# Patient Record
Sex: MALE | Race: WHITE | NOT HISPANIC OR LATINO
[De-identification: names, ages, dates, MRNs, and addresses within clinical notes are randomized per-mention and may not be internally consistent; named-entity substitution may affect disease eponyms.]

---

## 2020-07-13 ENCOUNTER — RESULT REVIEW (OUTPATIENT)
Age: 65
End: 2020-07-13

## 2021-01-18 ENCOUNTER — INPATIENT (INPATIENT)
Facility: HOSPITAL | Age: 66
LOS: 2 days | Discharge: HOME | DRG: 286 | End: 2021-01-21
Attending: STUDENT IN AN ORGANIZED HEALTH CARE EDUCATION/TRAINING PROGRAM | Admitting: STUDENT IN AN ORGANIZED HEALTH CARE EDUCATION/TRAINING PROGRAM
Payer: COMMERCIAL

## 2021-01-18 VITALS
TEMPERATURE: 98 F | HEART RATE: 64 BPM | SYSTOLIC BLOOD PRESSURE: 126 MMHG | DIASTOLIC BLOOD PRESSURE: 79 MMHG | HEIGHT: 69 IN | OXYGEN SATURATION: 98 % | WEIGHT: 164.91 LBS | RESPIRATION RATE: 18 BRPM

## 2021-01-18 DIAGNOSIS — I48.91 UNSPECIFIED ATRIAL FIBRILLATION: ICD-10-CM

## 2021-01-18 PROBLEM — Z00.00 ENCOUNTER FOR PREVENTIVE HEALTH EXAMINATION: Status: ACTIVE | Noted: 2021-01-18

## 2021-01-18 LAB
ALBUMIN SERPL ELPH-MCNC: 4.4 G/DL — SIGNIFICANT CHANGE UP (ref 3.3–5)
ALP SERPL-CCNC: 53 U/L — SIGNIFICANT CHANGE UP (ref 40–120)
ALT FLD-CCNC: 279 U/L — HIGH (ref 10–45)
ANION GAP SERPL CALC-SCNC: 20 MMOL/L — HIGH (ref 5–17)
APPEARANCE UR: CLEAR — SIGNIFICANT CHANGE UP
APTT BLD: 27.3 SEC — LOW (ref 27.5–35.5)
AST SERPL-CCNC: 156 U/L — HIGH (ref 10–40)
BACTERIA # UR AUTO: NEGATIVE — SIGNIFICANT CHANGE UP
BASE EXCESS BLDV CALC-SCNC: -1.7 MMOL/L — SIGNIFICANT CHANGE UP (ref -2–2)
BASE EXCESS BLDV CALC-SCNC: -3.3 MMOL/L — LOW (ref -2–2)
BASOPHILS # BLD AUTO: 0 K/UL — SIGNIFICANT CHANGE UP (ref 0–0.2)
BASOPHILS NFR BLD AUTO: 0 % — SIGNIFICANT CHANGE UP (ref 0–2)
BILIRUB SERPL-MCNC: 0.7 MG/DL — SIGNIFICANT CHANGE UP (ref 0.2–1.2)
BILIRUB UR-MCNC: NEGATIVE — SIGNIFICANT CHANGE UP
BUN SERPL-MCNC: 39 MG/DL — HIGH (ref 7–23)
CA-I SERPL-SCNC: 1.15 MMOL/L — SIGNIFICANT CHANGE UP (ref 1.12–1.3)
CA-I SERPL-SCNC: 1.16 MMOL/L — SIGNIFICANT CHANGE UP (ref 1.12–1.3)
CALCIUM SERPL-MCNC: 9.6 MG/DL — SIGNIFICANT CHANGE UP (ref 8.4–10.5)
CHLORIDE BLDV-SCNC: 106 MMOL/L — SIGNIFICANT CHANGE UP (ref 96–108)
CHLORIDE BLDV-SCNC: 108 MMOL/L — SIGNIFICANT CHANGE UP (ref 96–108)
CHLORIDE SERPL-SCNC: 100 MMOL/L — SIGNIFICANT CHANGE UP (ref 96–108)
CO2 BLDV-SCNC: 25 MMOL/L — SIGNIFICANT CHANGE UP (ref 22–30)
CO2 BLDV-SCNC: 26 MMOL/L — SIGNIFICANT CHANGE UP (ref 22–30)
CO2 SERPL-SCNC: 17 MMOL/L — LOW (ref 22–31)
COLOR SPEC: YELLOW — SIGNIFICANT CHANGE UP
CREAT SERPL-MCNC: 1.75 MG/DL — HIGH (ref 0.5–1.3)
DIFF PNL FLD: NEGATIVE — SIGNIFICANT CHANGE UP
EOSINOPHIL # BLD AUTO: 0.12 K/UL — SIGNIFICANT CHANGE UP (ref 0–0.5)
EOSINOPHIL NFR BLD AUTO: 0.9 % — SIGNIFICANT CHANGE UP (ref 0–6)
EPI CELLS # UR: 1 /HPF — SIGNIFICANT CHANGE UP
GAS PNL BLDV: 136 MMOL/L — SIGNIFICANT CHANGE UP (ref 135–145)
GAS PNL BLDV: 139 MMOL/L — SIGNIFICANT CHANGE UP (ref 135–145)
GAS PNL BLDV: SIGNIFICANT CHANGE UP
GAS PNL BLDV: SIGNIFICANT CHANGE UP
GIANT PLATELETS BLD QL SMEAR: PRESENT — SIGNIFICANT CHANGE UP
GLUCOSE BLDV-MCNC: 147 MG/DL — HIGH (ref 70–99)
GLUCOSE BLDV-MCNC: 157 MG/DL — HIGH (ref 70–99)
GLUCOSE SERPL-MCNC: 195 MG/DL — HIGH (ref 70–99)
GLUCOSE UR QL: ABNORMAL
HCO3 BLDV-SCNC: 23 MMOL/L — SIGNIFICANT CHANGE UP (ref 21–29)
HCO3 BLDV-SCNC: 25 MMOL/L — SIGNIFICANT CHANGE UP (ref 21–29)
HCT VFR BLD CALC: 43.2 % — SIGNIFICANT CHANGE UP (ref 39–50)
HCT VFR BLDA CALC: 40 % — SIGNIFICANT CHANGE UP (ref 39–50)
HCT VFR BLDA CALC: 41 % — SIGNIFICANT CHANGE UP (ref 39–50)
HGB BLD CALC-MCNC: 13 G/DL — SIGNIFICANT CHANGE UP (ref 13–17)
HGB BLD CALC-MCNC: 13.5 G/DL — SIGNIFICANT CHANGE UP (ref 13–17)
HGB BLD-MCNC: 14 G/DL — SIGNIFICANT CHANGE UP (ref 13–17)
HYALINE CASTS # UR AUTO: 6 /LPF — HIGH (ref 0–2)
INR BLD: 1.09 RATIO — SIGNIFICANT CHANGE UP (ref 0.88–1.16)
KETONES UR-MCNC: NEGATIVE — SIGNIFICANT CHANGE UP
LACTATE BLDV-MCNC: 3.2 MMOL/L — HIGH (ref 0.7–2)
LACTATE BLDV-MCNC: 4.2 MMOL/L — CRITICAL HIGH (ref 0.7–2)
LEUKOCYTE ESTERASE UR-ACNC: NEGATIVE — SIGNIFICANT CHANGE UP
LYMPHOCYTES # BLD AUTO: 16.5 % — SIGNIFICANT CHANGE UP (ref 13–44)
LYMPHOCYTES # BLD AUTO: 2.25 K/UL — SIGNIFICANT CHANGE UP (ref 1–3.3)
MAGNESIUM SERPL-MCNC: 1.3 MG/DL — LOW (ref 1.6–2.6)
MANUAL SMEAR VERIFICATION: SIGNIFICANT CHANGE UP
MCHC RBC-ENTMCNC: 30.6 PG — SIGNIFICANT CHANGE UP (ref 27–34)
MCHC RBC-ENTMCNC: 32.4 GM/DL — SIGNIFICANT CHANGE UP (ref 32–36)
MCV RBC AUTO: 94.5 FL — SIGNIFICANT CHANGE UP (ref 80–100)
MONOCYTES # BLD AUTO: 1.06 K/UL — HIGH (ref 0–0.9)
MONOCYTES NFR BLD AUTO: 7.8 % — SIGNIFICANT CHANGE UP (ref 2–14)
NEUTROPHILS # BLD AUTO: 10.2 K/UL — HIGH (ref 1.8–7.4)
NEUTROPHILS NFR BLD AUTO: 74.8 % — SIGNIFICANT CHANGE UP (ref 43–77)
NITRITE UR-MCNC: NEGATIVE — SIGNIFICANT CHANGE UP
NRBC # BLD: 1 /100 — HIGH (ref 0–0)
PCO2 BLDV: 50 MMHG — SIGNIFICANT CHANGE UP (ref 35–50)
PCO2 BLDV: 50 MMHG — SIGNIFICANT CHANGE UP (ref 35–50)
PH BLDV: 7.29 — LOW (ref 7.35–7.45)
PH BLDV: 7.31 — LOW (ref 7.35–7.45)
PH UR: 6 — SIGNIFICANT CHANGE UP (ref 5–8)
PHOSPHATE SERPL-MCNC: 6 MG/DL — HIGH (ref 2.5–4.5)
PLAT MORPH BLD: ABNORMAL
PLATELET # BLD AUTO: 361 K/UL — SIGNIFICANT CHANGE UP (ref 150–400)
PO2 BLDV: 23 MMHG — LOW (ref 25–45)
PO2 BLDV: <20 MMHG — LOW (ref 25–45)
POTASSIUM BLDV-SCNC: 4 MMOL/L — SIGNIFICANT CHANGE UP (ref 3.5–5.3)
POTASSIUM BLDV-SCNC: 4.6 MMOL/L — SIGNIFICANT CHANGE UP (ref 3.5–5.3)
POTASSIUM SERPL-MCNC: 4.5 MMOL/L — SIGNIFICANT CHANGE UP (ref 3.5–5.3)
POTASSIUM SERPL-SCNC: 4.5 MMOL/L — SIGNIFICANT CHANGE UP (ref 3.5–5.3)
PROT SERPL-MCNC: 7.1 G/DL — SIGNIFICANT CHANGE UP (ref 6–8.3)
PROT UR-MCNC: ABNORMAL
PROTHROM AB SERPL-ACNC: 13 SEC — SIGNIFICANT CHANGE UP (ref 10.6–13.6)
RBC # BLD: 4.57 M/UL — SIGNIFICANT CHANGE UP (ref 4.2–5.8)
RBC # FLD: 13.5 % — SIGNIFICANT CHANGE UP (ref 10.3–14.5)
RBC BLD AUTO: SIGNIFICANT CHANGE UP
RBC CASTS # UR COMP ASSIST: 1 /HPF — SIGNIFICANT CHANGE UP (ref 0–4)
SAO2 % BLDV: 15 % — LOW (ref 67–88)
SAO2 % BLDV: 21 % — LOW (ref 67–88)
SODIUM SERPL-SCNC: 137 MMOL/L — SIGNIFICANT CHANGE UP (ref 135–145)
SP GR SPEC: 1.05 — HIGH (ref 1.01–1.02)
TROPONIN T, HIGH SENSITIVITY RESULT: 21 NG/L — SIGNIFICANT CHANGE UP (ref 0–51)
TROPONIN T, HIGH SENSITIVITY RESULT: 24 NG/L — SIGNIFICANT CHANGE UP (ref 0–51)
UROBILINOGEN FLD QL: NEGATIVE — SIGNIFICANT CHANGE UP
WBC # BLD: 13.64 K/UL — HIGH (ref 3.8–10.5)
WBC # FLD AUTO: 13.64 K/UL — HIGH (ref 3.8–10.5)
WBC UR QL: 2 /HPF — SIGNIFICANT CHANGE UP (ref 0–5)

## 2021-01-18 PROCEDURE — 93010 ELECTROCARDIOGRAM REPORT: CPT

## 2021-01-18 PROCEDURE — 99223 1ST HOSP IP/OBS HIGH 75: CPT | Mod: GC

## 2021-01-18 PROCEDURE — 71275 CT ANGIOGRAPHY CHEST: CPT | Mod: 26

## 2021-01-18 PROCEDURE — 71045 X-RAY EXAM CHEST 1 VIEW: CPT | Mod: 26

## 2021-01-18 PROCEDURE — 99291 CRITICAL CARE FIRST HOUR: CPT

## 2021-01-18 PROCEDURE — 99255 IP/OBS CONSLTJ NEW/EST HI 80: CPT

## 2021-01-18 RX ORDER — HEPARIN SODIUM 5000 [USP'U]/ML
6500 INJECTION INTRAVENOUS; SUBCUTANEOUS ONCE
Refills: 0 | Status: COMPLETED | OUTPATIENT
Start: 2021-01-18 | End: 2021-01-18

## 2021-01-18 RX ORDER — ASPIRIN/CALCIUM CARB/MAGNESIUM 324 MG
162 TABLET ORAL DAILY
Refills: 0 | Status: DISCONTINUED | OUTPATIENT
Start: 2021-01-18 | End: 2021-01-19

## 2021-01-18 RX ORDER — METOPROLOL TARTRATE 50 MG
10 TABLET ORAL ONCE
Refills: 0 | Status: DISCONTINUED | OUTPATIENT
Start: 2021-01-18 | End: 2021-01-18

## 2021-01-18 RX ORDER — METOPROLOL TARTRATE 50 MG
5 TABLET ORAL ONCE
Refills: 0 | Status: COMPLETED | OUTPATIENT
Start: 2021-01-18 | End: 2021-01-18

## 2021-01-18 RX ORDER — HEPARIN SODIUM 5000 [USP'U]/ML
INJECTION INTRAVENOUS; SUBCUTANEOUS
Qty: 25000 | Refills: 0 | Status: DISCONTINUED | OUTPATIENT
Start: 2021-01-18 | End: 2021-01-20

## 2021-01-18 RX ORDER — DILTIAZEM HCL 120 MG
10 CAPSULE, EXT RELEASE 24 HR ORAL ONCE
Refills: 0 | Status: DISCONTINUED | OUTPATIENT
Start: 2021-01-18 | End: 2021-01-18

## 2021-01-18 RX ORDER — DILTIAZEM HCL 120 MG
30 CAPSULE, EXT RELEASE 24 HR ORAL ONCE
Refills: 0 | Status: COMPLETED | OUTPATIENT
Start: 2021-01-18 | End: 2021-01-18

## 2021-01-18 RX ORDER — SODIUM CHLORIDE 9 MG/ML
500 INJECTION INTRAMUSCULAR; INTRAVENOUS; SUBCUTANEOUS ONCE
Refills: 0 | Status: COMPLETED | OUTPATIENT
Start: 2021-01-18 | End: 2021-01-18

## 2021-01-18 RX ORDER — FUROSEMIDE 40 MG
20 TABLET ORAL ONCE
Refills: 0 | Status: COMPLETED | OUTPATIENT
Start: 2021-01-18 | End: 2021-01-18

## 2021-01-18 RX ORDER — DILTIAZEM HCL 120 MG
10 CAPSULE, EXT RELEASE 24 HR ORAL ONCE
Refills: 0 | Status: COMPLETED | OUTPATIENT
Start: 2021-01-18 | End: 2021-01-18

## 2021-01-18 RX ORDER — MAGNESIUM SULFATE 500 MG/ML
2 VIAL (ML) INJECTION ONCE
Refills: 0 | Status: COMPLETED | OUTPATIENT
Start: 2021-01-18 | End: 2021-01-18

## 2021-01-18 RX ORDER — ACETAMINOPHEN 500 MG
1000 TABLET ORAL ONCE
Refills: 0 | Status: COMPLETED | OUTPATIENT
Start: 2021-01-18 | End: 2021-01-18

## 2021-01-18 RX ORDER — DILTIAZEM HCL 120 MG
30 CAPSULE, EXT RELEASE 24 HR ORAL EVERY 6 HOURS
Refills: 0 | Status: DISCONTINUED | OUTPATIENT
Start: 2021-01-18 | End: 2021-01-19

## 2021-01-18 RX ORDER — METOPROLOL TARTRATE 50 MG
50 TABLET ORAL ONCE
Refills: 0 | Status: DISCONTINUED | OUTPATIENT
Start: 2021-01-18 | End: 2021-01-18

## 2021-01-18 RX ADMIN — Medication 20 MILLIGRAM(S): at 23:28

## 2021-01-18 RX ADMIN — Medication 5 MILLIGRAM(S): at 18:01

## 2021-01-18 RX ADMIN — Medication 20 MILLIGRAM(S): at 21:33

## 2021-01-18 RX ADMIN — SODIUM CHLORIDE 500 MILLILITER(S): 9 INJECTION INTRAMUSCULAR; INTRAVENOUS; SUBCUTANEOUS at 19:16

## 2021-01-18 RX ADMIN — Medication 400 MILLIGRAM(S): at 21:38

## 2021-01-18 RX ADMIN — Medication 50 GRAM(S): at 19:40

## 2021-01-18 RX ADMIN — Medication 162 MILLIGRAM(S): at 21:33

## 2021-01-18 RX ADMIN — HEPARIN SODIUM 1400 UNIT(S)/HR: 5000 INJECTION INTRAVENOUS; SUBCUTANEOUS at 20:47

## 2021-01-18 RX ADMIN — Medication 30 MILLIGRAM(S): at 19:47

## 2021-01-18 RX ADMIN — Medication 5 MILLIGRAM(S): at 18:00

## 2021-01-18 RX ADMIN — HEPARIN SODIUM 6500 UNIT(S): 5000 INJECTION INTRAVENOUS; SUBCUTANEOUS at 20:49

## 2021-01-18 RX ADMIN — Medication 10 MILLIGRAM(S): at 19:28

## 2021-01-18 NOTE — H&P ADULT - PROBLEM SELECTOR PLAN 2
Patient possibly has disc herniation  However no imaging done at this time  Obtain MRI lumbar spine.  Patient was prescribed a prednisone taper outpatient, now tapering down to 10mg qday this week. Day team can call patient's PCP Dr. chay Paul [phone] to discuss what's the working diagnosis and if the prednisone was supposed to be tapered off. Patient presented with SOB, found to have Afib with RVR, Hr at 190s at presentation  s/p lopressor, diltiazem IV push.  Cardiology consulted, appreciate recs  c/w cardizem 30mg q6h, and PRN cardizem 10mg IV push.  c/w heparin drip.  NPO today for possible PIERRE No baseline Cr available,   if worsen, renal consult.  send urine lytes. Obtain kidney and bladder US  day team can call patient's PCP Dr. Mikhail boothe [phone] for further info  Hold off IVF for now considering patient had pulmonary edema in the ER.

## 2021-01-18 NOTE — H&P ADULT - ASSESSMENT
64 yo M hx of HTN, HLD, T2DM, presented with SOB, found to have Afib with RVR 66 yo M hx of HTN, HLD, T2DM, presented with SOB, found to have Afib with RVR, also has right leg pain. 66 yo M hx of HTN, HLD, T2DM, presented with SOB, found to have Afib with RVR, SUSAN, transaminitis, likely mild pulm edema, and chronic right leg pain.

## 2021-01-18 NOTE — H&P ADULT - PROBLEM SELECTOR PLAN 5
c/w home atorvastatin 40, fenofibrate 145 hepatitis panel  abdominal US with kidney and bladder ultrasound

## 2021-01-18 NOTE — ED PROVIDER NOTE - NS ED ROS FT
Review of Systems:  · Constitutional: episode of near syncope, no chills, no fever, no night sweats, no weight loss  · Nose: no epistaxis  · Mouth/Throat: no difficulty in swallowing, trachea midline, uvula midline  . Cardio: No CP, no palpitations, no chest pressure, no ripping chest pain  · Respiratory: no cough, no exertional dyspnea, no hemoptysis, no orthopnea, no shortness of breath  · Gastrointestinal: no abdominal pain, no diarrhea, no melena, no nausea, no vomiting  · Genitourinary: no difficulty urinating, no dysuria, no hematuria  · MUSCULOSKELETAL: RLE pain, FROM of all extremities  · Skin: no abrasion; no bruising; no laceration  · Neurological: no change in level of consciousness, no headache, no seizures  · Endocrine: no excessive urination  · ROS STATEMENT: all other ROS negative except as per HPI Review of Systems:  · Constitutional: episode of near syncope, no chills, no fever, no night sweats, no weight loss  · Nose: no epistaxis  · Mouth/Throat: no difficulty in swallowing, trachea midline, uvula midline  . Cardio: No CP, no palpitations, no chest pressure, no ripping chest pain  · Respiratory: no cough, + exertional dyspnea, no hemoptysis, no orthopnea, + shortness of breath  · Gastrointestinal: no abdominal pain, no diarrhea, no melena, no nausea, no vomiting  · Genitourinary: no difficulty urinating, no dysuria, no hematuria  · MUSCULOSKELETAL: RLE pain, FROM of all extremities  · Skin: no abrasion; no bruising; no laceration  · Neurological: no change in level of consciousness, no headache, no seizures  · Endocrine: no excessive urination  · ROS STATEMENT: all other ROS negative except as per HPI

## 2021-01-18 NOTE — ED ADULT TRIAGE NOTE - CHIEF COMPLAINT QUOTE
shortness of breath x 4-5 days. Received covid vaccine saturday. Also c/o right calf pain x 6 months. Sent by PCP Mikhail Paul

## 2021-01-18 NOTE — H&P ADULT - NSHPREVIEWOFSYSTEMS_GEN_ALL_CORE
REVIEW OF SYSTEMS:    CONSTITUTIONAL: No weakness, fevers or chills  EYES: No visual changes;    ENT: No vertigo or throat pain   NECK: No pain or stiffness  RESPIRATORY: No cough, wheezing, hemoptysis; no shortness of breath  CARDIOVASCULAR: No chest pain or palpitations  GASTROINTESTINAL: No abdominal or epigastric pain. No nausea, vomiting, or hematemesis; No diarrhea or constipation. No melena or hematochezia.  GENITOURINARY: No dysuria, frequency or hematuria  NEUROLOGICAL: No numbness, right leg weakness and pain.  SKIN: No itching, rashes  Psychiatric: no SI or HI  Extremities: Right leg weakness and pain.

## 2021-01-18 NOTE — H&P ADULT - PROBLEM SELECTOR PLAN 3
Patient is on metformin 500 at home  No prior A1C available  low SSI for now  A1c with morning lab No baseline Cr available,   if worsen, renal consult.  send urine lytes. Obtain kidney and bladder US  day team can call patient's PCP Dr. Mikhail boothe [phone] for further info  Hold off IVF for now considering patient had pulmonary edema in the ER. Possibly 2/2 afib with rvr vs CHF exacerbation  likely cause of patient's SOB, mild hypoxia.  s/p 20 lasix IV  - holding further diuresis  - TTE  - tele

## 2021-01-18 NOTE — H&P ADULT - NSHPSOCIALHISTORY_GEN_ALL_CORE
Patient works as a doorman. Patient lives with his wife, and is independent. Patient denies any smoking, or recreational drug use. Patient rarely drink alcohol

## 2021-01-18 NOTE — ED PROVIDER NOTE - OBJECTIVE STATEMENT
65 year old male with pmhx of HTN, HLD, DMT2 presents to the ED with episodes of near syncope and SOB. patient reports having RLE pain x 6 months that rad 65 year old male with pmhx of HTN, HLD, DMT2 presents to the ED with episodes of near syncope and SOB. patient reports having RLE pain x 6 months that radiates from R hip down leg. patient reports over the last week episodes of near syncope with walkiong 65 year old male with pmhx of HTN, HLD, DMT2 presents to the ED with episodes of near syncope and SOB. patient reports having RLE pain x 6 months that radiates from R hip down leg. patient reports over the last week episodes of near syncope with walking, increased sob with minimal exertion. No new nausea/vomiting/ diarrhea/ abd pain. No orthopnea but pt normally sleeps upright so pt is not sure. Pt has been on po steroids recently for his diffuse sharp spasms of back pain and leg pains- has seen neurologist but has not had any imaging thus far. 65 year old male with pmhx of HTN, HLD, DMT2 presents to the ED with episodes of near syncope and SOB. patient reports having RLE pain x 6 months that radiates from R hip down leg. patient reports over the last week episodes of near syncope with walking, increased sob with minimal exertion. No new nausea/vomiting/ diarrhea/ abd pain. No orthopnea but pt normally sleeps upright so pt is not sure. Pt has been on po steroids recently for his diffuse sharp spasms of back pain and leg pains- has seen neurologist but has not had any imaging thus far. patient denies chest pain, Shortness of Breath, abdominal pain, Nausea/Vomiting/Diarrhea, weakness, confusion, vision changes, urinary symptoms, syncope, falls, trauma, discharge, bleeding, fevers 65 year old male with pmhx of HTN, HLD, DMT2 presents to the ED with episodes of near syncope and SOB. patient reports having RLE pain x 6 months that radiates from R hip down leg. patient reports over the last week episodes of near syncope with walking, increased sob with minimal exertion. No new nausea/vomiting/ diarrhea/ abd pain. No orthopnea but pt normally sleeps upright so pt is not sure. Pt has been on po steroids recently for his diffuse sharp spasms of back pain and leg pains- has seen neurologist but has not had any imaging thus far. Patient reports sob w/ exertion. Patient denies chest pain, abdominal pain, Nausea/Vomiting/Diarrhea, weakness, confusion, vision changes, urinary symptoms, falls, trauma, discharge, bleeding, fevers

## 2021-01-18 NOTE — H&P ADULT - PROBLEM SELECTOR PLAN 8
1. Name of PCP:  2. PCP contacted on admission: [  ] Y   [  ] N  3. PCP contacted at Discharge: [  ] Y   [  ] N  4. Post-Discharge Appointment Date and Location:   5 Summary of Handoff given to PCP: C/w metoprolol, cardizem for tachycardia  hold off amlodipine for now, can restart if bp goes high

## 2021-01-18 NOTE — ED PROVIDER NOTE - PHYSICAL EXAMINATION
On Physical Exam:  General: well appearing, in NAD, speaking clearly in full sentences and without difficulty; cooperative with exam  HEENT: PERRL, MMM  Neck: no neck tenderness, no nuchal rigidity  Cardiac: tachycardic s1, s2; irregularly irregular  Lungs: CTABL  Abdomen: soft nontender/nondistended  : no bladder tenderness or distension  Skin: warm, intact, no rash  Extremities: RLE pain to palpation to posterior extremity, no peripheral edema, no gross deformities  Neuro: no gross neurologic deficits

## 2021-01-18 NOTE — ED ADULT NURSE NOTE - OBJECTIVE STATEMENT
66 yo m pmx of HTN on metoprolol, HLD, DM presents to the ED with c/o SOB and JI. PT reports he received the covid vaccine, and noticed that for the past two days he has been experiencing SOB, states "after I shower I get extremely tired." Pt reports becoming increasingly exhausted when he ambulates a couple of blocks, reports he gets tired after walking a flight of stairs. patient also reports having RLE pain x 6 months that radiates from R hip down leg. PT states that he also noticed that over the past weeks he has been experiencing near syncopal episodes at home, states that his vision "sort of goes blurry." Pt denies headache, dizziness, chest pain, palpitations, cough, abdominal pain, n/v/d, urinary symptoms, fevers, chills, weakness at this time. PT placed on cardiac monitoring, MD Peace aware of the pt's current HR. Cards at bedside.

## 2021-01-18 NOTE — ED PROVIDER NOTE - PROGRESS NOTE DETAILS
Peace DO: spoke w/ dr soares on behalf of dr boothe's office, admits to hospitalist. updated their office on current plan of care. pt seen by cards, bedside pocus without significant pericardial effusion or RV strain - plethoric ivc, slight jvd on exam, but no b lines or pleural effusions - trial gentle fluids , did not respond to lopressor x 2; will trial cardizem, hemodynamically stable but bps soft, will try gently hydration and try to get cta chest to r/o PE, lower suspicion Peace DO: pts -160, will obtain cta then start cardizem Peace DO: pts cta reviewed, no saddle embolism noted, so cardizem given 10mg, with improvement in HR , will give po dose, bp improved w/ rate control, pt asymptomatic at current. Peace DO: pt stable, aware of need for admission, HR  , predominantly 118 but now back up, will redose a dose of IV cardizem, lasix, and pts bp stable, repeat trop stable, will get repeat ekg, admitted to hospitalist under tele, cards following. afebrile, leukoctyosis suspected due to po steroids, lactate downtrending- poss from fluid overload. pt not septic Peace DO: hr low 100s each time RN about to give dilt push, cancelled Peace DO: hr 90 to low 100s each time RN about to give dilt push, cancelled IV push dose.

## 2021-01-18 NOTE — H&P ADULT - HISTORY OF PRESENT ILLNESS
66 yo M hx of HTN, HLD, T2DM presented with shortness of breath. Patient states that he has been having intermittent shortness of breath episodes for 5-6 days. Patient has episodes for 5-6 times per day. Patient states that he doesn't find any trigger for these episodes, no alleviating or aggravating factors. Patient visited his PCP and was told to come to the ER. Patient also complains right leg pain x6 months. Patient also has back pain. Patient's PCP told him his right leg pain is from his back but there hasn't any imaging done. It is scheduled on Wednesday.    In the ED patient's HR was noticed to be at 190s. Lopressor was given without proper response, cardizem IV push was given and patient's HR decreased to 100s. Cardiology has been consulted.

## 2021-01-18 NOTE — H&P ADULT - PROBLEM SELECTOR PLAN 6
No baseline Cr available,  day team can call patient's PCP Dr. Mikhail boothe [phone] for further info  Hold off IVF for now considering patient had pulmonary edema in the ER. Patient possibly has disc herniation  However no imaging done at this time  CT Lumbar spine.   PT eval  Patient was prescribed a prednisone taper outpatient, now tapering down to 10mg qday this week. Day team can call patient's PCP Dr. chay Paul [phone] to discuss what's the working diagnosis and if the prednisone was supposed to be tapered off.

## 2021-01-18 NOTE — H&P ADULT - NSHPPHYSICALEXAM_GEN_ALL_CORE
VITALS: Vital Signs Last 24 Hrs  T(C): 37.3 (19 Jan 2021 00:30), Max: 37.3 (19 Jan 2021 00:30)  T(F): 99.1 (19 Jan 2021 00:30), Max: 99.1 (19 Jan 2021 00:30)  HR: 95 (19 Jan 2021 00:30) (64 - 180)  BP: 109/61 (19 Jan 2021 00:30) (98/66 - 156/136)  BP(mean): 79 (18 Jan 2021 23:30) (74 - 96)  RR: 18 (19 Jan 2021 00:30) (16 - 20)  SpO2: 96% (19 Jan 2021 00:30) (93% - 98%)    GENERAL: NAD, lying in bed comfortably  HEAD:  Atraumatic, Normocephalic  EYES: EOMI, PERRLA, conjunctiva and sclera clear  ENT: Moist mucous membranes  NECK: Supple, No JVD  CHEST/LUNG: Clear to auscultation bilaterally; No rales, rhonchi, wheezing, or rubs. Unlabored respirations  HEART: Regular rate and rhythm; No murmurs, rubs, or gallops  ABDOMEN: Bowel sounds present; Soft, Nontender, Nondistended. No hepatomegally  EXTREMITIES:  2+ Peripheral Pulses, brisk capillary refill. No clubbing, cyanosis, or edema  NERVOUS SYSTEM:  Alert & Oriented X3, speech clear. RLE strength 4/5  MSK: Right leg pain on passive movement  SKIN: No rashes or lesions  Psychiatric: normal behavior, normal speech. VITALS: Vital Signs Last 24 Hrs  T(C): 37.3 (19 Jan 2021 00:30), Max: 37.3 (19 Jan 2021 00:30)  T(F): 99.1 (19 Jan 2021 00:30), Max: 99.1 (19 Jan 2021 00:30)  HR: 95 (19 Jan 2021 00:30) (64 - 180)  BP: 109/61 (19 Jan 2021 00:30) (98/66 - 156/136)  BP(mean): 79 (18 Jan 2021 23:30) (74 - 96)  RR: 18 (19 Jan 2021 00:30) (16 - 20)  SpO2: 96% (19 Jan 2021 00:30) (93% - 98%)    GENERAL: NAD, lying in bed comfortably  HEAD:  Atraumatic, Normocephalic  EYES: EOMI, PERRLA, conjunctiva and sclera clear  ENT: Moist mucous membranes  NECK: Supple, No JVD  CHEST/LUNG: Clear to auscultation bilaterally; No rales, rhonchi, wheezing, or rubs. Unlabored respirations  HEART: irregular rate and rhythm; No murmurs, rubs, or gallops  ABDOMEN: Bowel sounds present; Soft, Nontender, Nondistended. No hepatomegally  EXTREMITIES:  2+ Peripheral Pulses, brisk capillary refill. No clubbing, cyanosis, or edema  NERVOUS SYSTEM:  Alert & Oriented X3, speech clear. RLE strength 4/5  MSK: Right leg pain on passive movement  SKIN: No rashes or lesions  Psychiatric: normal behavior, normal speech. VITALS: Vital Signs Last 24 Hrs  T(C): 37.3 (19 Jan 2021 00:30), Max: 37.3 (19 Jan 2021 00:30)  T(F): 99.1 (19 Jan 2021 00:30), Max: 99.1 (19 Jan 2021 00:30)  HR: 95 (19 Jan 2021 00:30) (64 - 180)  BP: 109/61 (19 Jan 2021 00:30) (98/66 - 156/136)  BP(mean): 79 (18 Jan 2021 23:30) (74 - 96)  RR: 18 (19 Jan 2021 00:30) (16 - 20)  SpO2: 96% (19 Jan 2021 00:30) (93% - 98%)    GENERAL: NAD, lying in bed comfortably  HEAD:  Atraumatic, Normocephalic  EYES: EOMI, PERRLA, conjunctiva and sclera clear  ENT: Moist mucous membranes  NECK: Supple, No JVD  CHEST/LUNG: Clear to auscultation bilaterally; No rales, rhonchi, wheezing, or rubs. Unlabored respirations  HEART: irregular rate and rhythm; No murmurs, rubs, or gallops  ABDOMEN: Bowel sounds present; Soft, Nontender, Nondistended. No hepatomegally  EXTREMITIES:  2+ Peripheral Pulses, brisk capillary refill. No clubbing, cyanosis, or edema  NERVOUS SYSTEM:  Alert & Oriented X3, speech clear. RLE strength 4/5  MSK: Right leg pain on passive movement  SKIN: No rashes or lesions  Psychiatric: normal behavior, normal speech. Upset affect.

## 2021-01-18 NOTE — H&P ADULT - PROBLEM SELECTOR PLAN 4
C/w metoprolol, cardizem  hold off amlodipine for now, can restart if bp goes high Patient tachycardic with elevated WBC. Afebrile  monitor off abx

## 2021-01-18 NOTE — ED PROVIDER NOTE - CLINICAL SUMMARY MEDICAL DECISION MAKING FREE TEXT BOX
65M hx htn, hld, dm, p/w sob, exertional dyspnea, near syncope, symptoms developing over last 1 week- found her to be in afib w/ RVR rate 180-190, hemodynamically stable- unclear precipitant- pt does have significant cardiac risk factors. pt has been on po steroids for back spasms and leg pain- unclear if this is related. pt does take po metoprolol 100mg in the AM and already took his morning dose prior to arrival. no infectious symptoms. plan to rule out PE as possible precipitant, rate control, initiate heparin, and admit for further cardiac eval- pt w/ mild jvd and plethoric IVC on bedside pocus suggesting some aspect of heart failure possibly from uncontrolled heart rate. trialled small dose of fluids as pt states he has not been eating/drinking much and if not responsive then can conisder dose of lasix

## 2021-01-18 NOTE — H&P ADULT - PROBLEM SELECTOR PLAN 1
Patient presented with SOB, found to have Afib with RVR, Hr at 190s at presentation  s/p lopressor, diltiazem IV push.  Cardiology consulted, appreciate recs  c/w Patient presented with SOB, found to have Afib with RVR, Hr at 190s at presentation  s/p lopressor, diltiazem IV push.  Cardiology consulted, appreciate recs  c/w cardizem 30mg q6h, and PRN cardizem 10mg IV push.  c/w heparin drip.  NPO today for possible PIERRE Possibly 2/2 afib with rvr vs CHF exacerbation  likely cause of patient's SOB, mild hypoxia.  s/p 20 lasix IV

## 2021-01-18 NOTE — H&P ADULT - PROBLEM SELECTOR PLAN 7
DVT: patient on heparin drip Patient is on metformin 500 at home  No prior A1C available  low SSI for now  A1c with morning lab

## 2021-01-18 NOTE — H&P ADULT - NSHPLABSRESULTS_GEN_ALL_CORE
pesonally reviewed labs, imaging, ekg                          14.0   13.64 )-----------( 361      ( 2021 18:08 )             43.2           137  |  100  |  39<H>  ----------------------------<  195<H>  4.5   |  17<L>  |  1.75<H>    Ca    9.6      2021 18:08  Phos  6.0       Mg     1.3         TPro  7.1  /  Alb  4.4  /  TBili  0.7  /  DBili  x   /  AST  156<H>  /  ALT  279<H>  /  AlkPhos  53            Urinalysis Basic - ( 2021 21:54 )    Color: Yellow / Appearance: Clear / S.054 / pH: x  Gluc: x / Ketone: Negative  / Bili: Negative / Urobili: Negative   Blood: x / Protein: 30 mg/dL / Nitrite: Negative   Leuk Esterase: Negative / RBC: 1 /hpf / WBC 2 /HPF   Sq Epi: x / Non Sq Epi: 1 /hpf / Bacteria: Negative

## 2021-01-18 NOTE — H&P ADULT - PROBLEM SELECTOR PROBLEM 3
Type 2 diabetes mellitus without complication, without long-term current use of insulin SUSAN (acute kidney injury) Acute pulmonary edema

## 2021-01-18 NOTE — CONSULT NOTE ADULT - SUBJECTIVE AND OBJECTIVE BOX
Patient seen and evaluated at bedside    Chief Complaint: rapid AF    HPI: 65 year old male with pmhx of HTN, HLD, DM2 presents to the ED with episodes of near syncope and SOB. Patient reports RLE pain x 6 months that radiates from R hip down leg. Patient reports over the last week episodes of near syncope with walking, increased sob with minimal exertion. Denies CP, ab pain, f/c/n/v/d/c, orthopnea, LE edema, melena, hematuria, hematemesis. Of note, pt has been on PO steroids (1 month, w/ 1 week taper 40mg, then 30, then 20, then 10) x 1 month for back/leg pain (no imaging obtained thus far).     In the ED, VS T: 98.3, P: 64, BP: 126/79, RR: 18, O2: 98 RA. Patient HR then increased to 180s in rapid AF; he IV lopressor 5mg x2, then IV dilt 10 x 1, PO dilt 30 x 1, IV mag 2g, and 500 cc NS bolus. Patient currently denying CP, dyspnea, palpitations, presyncope, syncope, HA, ab pain.    PMHx:   Diabetes    HLD (hyperlipidemia)    HTN (hypertension)      PSHx:     FAMILY HISTORY:    Allergies:  Cipro (Unknown)  penicillins (Unknown)    Home Medications:    Current Medications:     Social History  Smoking History: denies  Alcohol Use: denies  Drug Use: denies    REVIEW OF SYSTEMS:  Constitutional:     [X] negative [ ] fevers [ ] chills [ ] weight loss [ ] weight gain  HEENT:                  [X] negative [ ] dry eyes [ ] eye irritation [ ] postnasal drip [ ] nasal congestion  CV:                         [X] negative  [ ] chest pain [ ] orthopnea [ ] palpitations [ ] murmur  Resp:                     [ ] negative [ ] cough [X] shortness of breath [X] dyspnea [ ] wheezing [ ] sputum [ ] hemoptysis  GI:                          [X] negative [ ] nausea [ ] vomiting [ ] diarrhea [ ] constipation [ ] abd pain [ ] dysphagia   :                        [X] negative [ ] dysuria [ ] nocturia [ ] hematuria [ ] increased urinary frequency  MSK:                      [ ] negative [X] back pain [X] myalgias [ ] arthralgias [ ] fracture  Skin:                       [X] negative [ ] rash [ ] itch  Neuro:                   [X] negative [ ] headache [ ] dizziness [ ] syncope [ ] weakness [ ] numbness  Psych:                    [X] negative [ ] anxiety [ ] depression  Endo:                     [X] negative [ ] diabetes [ ] thyroid problem  Heme/Lymph:      [X] negative [ ] anemia [ ] bleeding problem  Allergic/Immune: [X] negative [ ] itchy eyes [ ] nasal discharge [ ] hives [ ] angioedema    [X] All other systems negative or otherwise described above.  [ ] Unable to assess ROS because ________.    ICU Vital Signs Last 24 Hrs  T(C): 36.7 (18 Jan 2021 18:00), Max: 37 (18 Jan 2021 15:05)  T(F): 98.1 (18 Jan 2021 18:00), Max: 98.6 (18 Jan 2021 15:05)  HR: 112 (18 Jan 2021 19:43) (64 - 180)  BP: 116/76 (18 Jan 2021 19:43) (98/66 - 156/136)  BP(mean): 89 (18 Jan 2021 19:43) (74 - 89)  ABP: --  ABP(mean): --  RR: 20 (18 Jan 2021 19:43) (16 - 20)  SpO2: 96% (18 Jan 2021 19:43) (95% - 98%)    Daily Height in cm: 175.26 (18 Jan 2021 13:40)    Daily     Physical Exam:  GENERAL: No acute distress, well-developed  HEAD:  Atraumatic, Normocephalic  ENT: EOMI, conjunctiva and sclera clear, Neck supple, No JVD, moist mucosa  CHEST/LUNG: Clear to auscultation bilaterally; No wheeze, equal breath sounds bilaterally   BACK: No spinal tenderness  HEART: Irregular rate and rhythm; No murmurs, rubs, or gallops, radial and DP 2+ b/l, euvolemic  ABDOMEN: Soft, Nontender, Nondistended  EXTREMITIES:  No clubbing, cyanosis, or edema  PSYCH: Nl behavior, nl affect  NEUROLOGY: AAOx3, non-focal  SKIN: Normal color, No rashes or lesions  LINES: no central lines present    Cardiovascular Diagnostic Testing    ECG: Personally reviewed; rapid AF    Echo: Personally reviewed; performed at bedside    Stress Testing: none    Cath: none    Imaging: none    CXR: Personally reviewed    Labs: Personally reviewed                        14.0   13.64 )-----------( 361      ( 18 Jan 2021 18:08 )             43.2     01-18    137  |  100  |  39<H>  ----------------------------<  195<H>  4.5   |  17<L>  |  1.75<H>    Ca    9.6      18 Jan 2021 18:08  Phos  6.0     01-18  Mg     1.3     01-18    TPro  7.1  /  Alb  4.4  /  TBili  0.7  /  DBili  x   /  AST  156<H>  /  ALT  279<H>  /  AlkPhos  53  01-18    PT/INR - ( 18 Jan 2021 18:08 )   PT: 13.0 sec;   INR: 1.09 ratio         PTT - ( 18 Jan 2021 18:08 )  PTT:27.3 sec  Serum Pro-Brain Natriuretic Peptide: 5876 pg/mL (01-18 @ 18:08)         Patient seen and evaluated at bedside    Chief Complaint: rapid AF    HPI: 65 year old male with pmhx of HTN, HLD, DM2 presents to the ED with episodes of near syncope and SOB. Patient reports RLE pain x 6 months that radiates from R hip down leg. Patient reports over the last week episodes of near syncope with walking, increased sob with minimal exertion. Denies CP, ab pain, f/c/n/v/d/c, orthopnea, LE edema, melena, hematuria, hematemesis. Of note, pt has been on PO steroids (1 month, w/ 1 week taper 40mg, then 30, then 20, then 10) x 1 month for back/leg pain (no imaging obtained thus far).     In the ED, VS T: 98.3, P: 64, BP: 126/79, RR: 18, O2: 98 RA. Patient HR then increased to 180s in rapid AF; he IV lopressor 5mg x2, then IV dilt 10 x 1, PO dilt 30 x 1, IV mag 2g, and 500 cc NS bolus. Patient currently denying CP, dyspnea, palpitations, presyncope, syncope, HA, ab pain.    PMHx:   Diabetes    HLD (hyperlipidemia)    HTN (hypertension)      PSHx:     FAMILY HISTORY:    Allergies:  Cipro (Unknown)  penicillins (Unknown)    Home Medications:    Current Medications:     Social History  Smoking History: denies  Alcohol Use: denies  Drug Use: denies    REVIEW OF SYSTEMS:  Constitutional:     [X] negative [ ] fevers [ ] chills [ ] weight loss [ ] weight gain  HEENT:                  [X] negative [ ] dry eyes [ ] eye irritation [ ] postnasal drip [ ] nasal congestion  CV:                         [X] negative  [ ] chest pain [ ] orthopnea [ ] palpitations [ ] murmur  Resp:                     [ ] negative [ ] cough [X] shortness of breath [X] dyspnea [ ] wheezing [ ] sputum [ ] hemoptysis  GI:                          [X] negative [ ] nausea [ ] vomiting [ ] diarrhea [ ] constipation [ ] abd pain [ ] dysphagia   :                        [X] negative [ ] dysuria [ ] nocturia [ ] hematuria [ ] increased urinary frequency  MSK:                      [ ] negative [X] back pain [X] myalgias [ ] arthralgias [ ] fracture  Skin:                       [X] negative [ ] rash [ ] itch  Neuro:                   [X] negative [ ] headache [ ] dizziness [ ] syncope [ ] weakness [ ] numbness  Psych:                    [X] negative [ ] anxiety [ ] depression  Endo:                     [X] negative [ ] diabetes [ ] thyroid problem  Heme/Lymph:      [X] negative [ ] anemia [ ] bleeding problem  Allergic/Immune: [X] negative [ ] itchy eyes [ ] nasal discharge [ ] hives [ ] angioedema    [X] All other systems negative or otherwise described above.  [ ] Unable to assess ROS because ________.    ICU Vital Signs Last 24 Hrs  T(C): 36.7 (18 Jan 2021 18:00), Max: 37 (18 Jan 2021 15:05)  T(F): 98.1 (18 Jan 2021 18:00), Max: 98.6 (18 Jan 2021 15:05)  HR: 112 (18 Jan 2021 19:43) (64 - 180)  BP: 116/76 (18 Jan 2021 19:43) (98/66 - 156/136)  BP(mean): 89 (18 Jan 2021 19:43) (74 - 89)  ABP: --  ABP(mean): --  RR: 20 (18 Jan 2021 19:43) (16 - 20)  SpO2: 96% (18 Jan 2021 19:43) (95% - 98%)    Daily Height in cm: 175.26 (18 Jan 2021 13:40)    Daily     Physical Exam:  GENERAL: No acute distress, well-developed  HEAD:  Atraumatic, Normocephalic  ENT: EOMI, conjunctiva and sclera clear, Neck supple, JVD +, moist mucosa  CHEST/LUNG: Clear to auscultation bilaterally; No wheeze, equal breath sounds bilaterally   BACK: No spinal tenderness  HEART: Irregular rate and rhythm; No murmurs, rubs, or gallops, radial and DP 2+ b/l, euvolemic  ABDOMEN: Soft, Nontender, Nondistended  EXTREMITIES:  No clubbing, cyanosis, or edema  PSYCH: Nl behavior, nl affect  NEUROLOGY: AAOx3, non-focal  SKIN: Normal color, No rashes or lesions  LINES: no central lines present    Cardiovascular Diagnostic Testing    ECG: Personally reviewed; rapid AF    Echo: Personally reviewed; performed at bedside    Stress Testing: none    Cath: none    Imaging: none    CXR: Personally reviewed    Labs: Personally reviewed                        14.0   13.64 )-----------( 361      ( 18 Jan 2021 18:08 )             43.2     01-18    137  |  100  |  39<H>  ----------------------------<  195<H>  4.5   |  17<L>  |  1.75<H>    Ca    9.6      18 Jan 2021 18:08  Phos  6.0     01-18  Mg     1.3     01-18    TPro  7.1  /  Alb  4.4  /  TBili  0.7  /  DBili  x   /  AST  156<H>  /  ALT  279<H>  /  AlkPhos  53  01-18    PT/INR - ( 18 Jan 2021 18:08 )   PT: 13.0 sec;   INR: 1.09 ratio         PTT - ( 18 Jan 2021 18:08 )  PTT:27.3 sec  Serum Pro-Brain Natriuretic Peptide: 5876 pg/mL (01-18 @ 18:08)

## 2021-01-18 NOTE — CONSULT NOTE ADULT - ASSESSMENT
65 year old male with pmhx of HTN, HLD, DM2 presents to the ED with episodes of near syncope and SOB x 5 days found in rapid AF    Plan: no prior hx of AF; bedside echo w/o LA/RA dilatation; difficult to assess LV/RV systolic function in the setting of   -hep gtt;   -c/w PO dilt w/ IV dilt prn  -obtain formal echo 65 year old male with pmhx of HTN, HLD, DM2 presents to the ED with episodes of near syncope and SOB x 5 days found in rapid AF    Plan: no prior hx of AF; bedside echo w/o LA/RA dilatation; difficult to assess LV/RV systolic function in the setting of AF; mild TR, minimal MR present; IVC >2.1cm, minimally collapsible consistent w/ elevated CVP  -hep gtt   -c/w PO dilt 30q6; uptitrate as tolerated; c/w IV dilt prn   -obtain formal echo  -would hold off on digoxin for now in the setting of unpredictable renal dysfunction; however this may be the next best option if further rate control is needed  -would hold off on amio for now due to elevated LFTs and subtherapeutic anticoagulation  -would make NPO at MN, consider PIERRE/cardioversion tomorrow if patient at therapeutic INR  -IV lasix 20 x 1 for pulm congestion; monitor I/Os; UOP  -f/u CTA chest; r/o PE   -f/u TFTs  -in the setting of prednisone; would r/o underlying infection    Darron Zepeda MD  Cardiology Fellow - F1  Text or Call: [phone]  For all New Consults and Questions:  [email or web address]   Login: [email or web address] 65 year old male with pmhx of HTN, HLD, DM2 presents to the ED with episodes of near syncope and SOB x 5 days found in rapid AF    Plan: no prior hx of AF; bedside echo w/o LA/RA dilatation; difficult to assess LV/RV systolic function in the setting of AF; mild TR, minimal MR present; IVC >2.1cm, minimally collapsible consistent w/ elevated CVP  -hep gtt   -c/w PO dilt 30q6; uptitrate as tolerated; c/w IV dilt prn   -obtain formal echo  -would hold off on digoxin for now in the setting of unpredictable renal dysfunction; however this may be the next best option if further rate control is needed  -would hold off on amio for now due to elevated LFTs and subtherapeutic anticoagulation  -would make NPO at MN, consider PIERRE/cardioversion tomorrow if patient at therapeutic INR  -IV lasix 20 x 1 for pulm congestion; monitor I/Os; UOP  -f/u CTA chest; r/o PE   -f/u TFTs  -in the setting of prednisone; would r/o underlying infection    Plan for PIERRE and DCCV today,   He plans to fu with Dr Juan Mays for outpatient cardiology, [phone]  please arrange outpatient fu with Dr Mays in 2-4 weeks from bette Zepeda MD  Cardiology Fellow - F1  Text or Call: [phone]  For all New Consults and Questions:  [email or web address]   Login: [email or web address]    Patient seen and examined with the fellow, the note above has been edited to reflect my independent history, physical exam, assessment and plan.      Casa Chiang MD, PhD  Cardiology Attending  [facility]/ [facility]    For day time coverage Mon-Fri see Non-Service Consult Attending on [email or web address], password: [email or web address]; daytime weekends covered by general cardiology consult service attending.)

## 2021-01-19 DIAGNOSIS — I10 ESSENTIAL (PRIMARY) HYPERTENSION: ICD-10-CM

## 2021-01-19 DIAGNOSIS — I48.91 UNSPECIFIED ATRIAL FIBRILLATION: ICD-10-CM

## 2021-01-19 DIAGNOSIS — N17.9 ACUTE KIDNEY FAILURE, UNSPECIFIED: ICD-10-CM

## 2021-01-19 DIAGNOSIS — M79.604 PAIN IN RIGHT LEG: ICD-10-CM

## 2021-01-19 DIAGNOSIS — R74.01 ELEVATION OF LEVELS OF LIVER TRANSAMINASE LEVELS: ICD-10-CM

## 2021-01-19 DIAGNOSIS — Z29.9 ENCOUNTER FOR PROPHYLACTIC MEASURES, UNSPECIFIED: ICD-10-CM

## 2021-01-19 DIAGNOSIS — E78.5 HYPERLIPIDEMIA, UNSPECIFIED: ICD-10-CM

## 2021-01-19 DIAGNOSIS — J81.0 ACUTE PULMONARY EDEMA: ICD-10-CM

## 2021-01-19 DIAGNOSIS — R65.10 SYSTEMIC INFLAMMATORY RESPONSE SYNDROME (SIRS) OF NON-INFECTIOUS ORIGIN WITHOUT ACUTE ORGAN DYSFUNCTION: ICD-10-CM

## 2021-01-19 DIAGNOSIS — Z98.1 ARTHRODESIS STATUS: Chronic | ICD-10-CM

## 2021-01-19 DIAGNOSIS — Z02.9 ENCOUNTER FOR ADMINISTRATIVE EXAMINATIONS, UNSPECIFIED: ICD-10-CM

## 2021-01-19 DIAGNOSIS — E11.9 TYPE 2 DIABETES MELLITUS WITHOUT COMPLICATIONS: ICD-10-CM

## 2021-01-19 LAB
APTT BLD: 118.8 SEC — HIGH (ref 27.5–35.5)
APTT BLD: 93.1 SEC — HIGH (ref 27.5–35.5)
APTT BLD: 96.5 SEC — HIGH (ref 27.5–35.5)
GLUCOSE BLDC GLUCOMTR-MCNC: 120 MG/DL — HIGH (ref 70–99)
GLUCOSE BLDC GLUCOMTR-MCNC: 126 MG/DL — HIGH (ref 70–99)
GLUCOSE BLDC GLUCOMTR-MCNC: 152 MG/DL — HIGH (ref 70–99)
GLUCOSE BLDC GLUCOMTR-MCNC: 170 MG/DL — HIGH (ref 70–99)
HCT VFR BLD CALC: 38.1 % — LOW (ref 39–50)
HGB BLD-MCNC: 12.9 G/DL — LOW (ref 13–17)
MCHC RBC-ENTMCNC: 30.9 PG — SIGNIFICANT CHANGE UP (ref 27–34)
MCHC RBC-ENTMCNC: 33.9 GM/DL — SIGNIFICANT CHANGE UP (ref 32–36)
MCV RBC AUTO: 91.4 FL — SIGNIFICANT CHANGE UP (ref 80–100)
NRBC # BLD: 0 /100 WBCS — SIGNIFICANT CHANGE UP (ref 0–0)
PLATELET # BLD AUTO: 263 K/UL — SIGNIFICANT CHANGE UP (ref 150–400)
RBC # BLD: 4.17 M/UL — LOW (ref 4.2–5.8)
RBC # FLD: 13.5 % — SIGNIFICANT CHANGE UP (ref 10.3–14.5)
SARS-COV-2 IGG SERPL QL IA: NEGATIVE — SIGNIFICANT CHANGE UP
SARS-COV-2 IGM SERPL IA-ACNC: 0.06 INDEX — SIGNIFICANT CHANGE UP
SARS-COV-2 RNA SPEC QL NAA+PROBE: SIGNIFICANT CHANGE UP
TSH SERPL-MCNC: 1.43 UIU/ML — SIGNIFICANT CHANGE UP (ref 0.27–4.2)
WBC # BLD: 11 K/UL — HIGH (ref 3.8–10.5)
WBC # FLD AUTO: 11 K/UL — HIGH (ref 3.8–10.5)

## 2021-01-19 PROCEDURE — 93312 ECHO TRANSESOPHAGEAL: CPT | Mod: 26

## 2021-01-19 PROCEDURE — 99233 SBSQ HOSP IP/OBS HIGH 50: CPT | Mod: GC

## 2021-01-19 PROCEDURE — 93010 ELECTROCARDIOGRAM REPORT: CPT | Mod: 77

## 2021-01-19 PROCEDURE — 76700 US EXAM ABDOM COMPLETE: CPT | Mod: 26

## 2021-01-19 PROCEDURE — 93010 ELECTROCARDIOGRAM REPORT: CPT | Mod: 76

## 2021-01-19 PROCEDURE — 93306 TTE W/DOPPLER COMPLETE: CPT | Mod: 26

## 2021-01-19 PROCEDURE — 92960 CARDIOVERSION ELECTRIC EXT: CPT

## 2021-01-19 PROCEDURE — 93010 ELECTROCARDIOGRAM REPORT: CPT | Mod: 59

## 2021-01-19 RX ORDER — METOPROLOL TARTRATE 50 MG
5 TABLET ORAL ONCE
Refills: 0 | Status: COMPLETED | OUTPATIENT
Start: 2021-01-19 | End: 2021-01-19

## 2021-01-19 RX ORDER — DEXTROSE 50 % IN WATER 50 %
12.5 SYRINGE (ML) INTRAVENOUS ONCE
Refills: 0 | Status: DISCONTINUED | OUTPATIENT
Start: 2021-01-19 | End: 2021-01-19

## 2021-01-19 RX ORDER — DEXTROSE 50 % IN WATER 50 %
15 SYRINGE (ML) INTRAVENOUS ONCE
Refills: 0 | Status: DISCONTINUED | OUTPATIENT
Start: 2021-01-19 | End: 2021-01-19

## 2021-01-19 RX ORDER — MORPHINE SULFATE 50 MG/1
4 CAPSULE, EXTENDED RELEASE ORAL ONCE
Refills: 0 | Status: DISCONTINUED | OUTPATIENT
Start: 2021-01-19 | End: 2021-01-19

## 2021-01-19 RX ORDER — METOPROLOL TARTRATE 50 MG
100 TABLET ORAL DAILY
Refills: 0 | Status: DISCONTINUED | OUTPATIENT
Start: 2021-01-19 | End: 2021-01-21

## 2021-01-19 RX ORDER — METFORMIN HYDROCHLORIDE 850 MG/1
0 TABLET ORAL
Qty: 0 | Refills: 0 | DISCHARGE

## 2021-01-19 RX ORDER — INSULIN LISPRO 100/ML
VIAL (ML) SUBCUTANEOUS
Refills: 0 | Status: DISCONTINUED | OUTPATIENT
Start: 2021-01-19 | End: 2021-01-21

## 2021-01-19 RX ORDER — SODIUM CHLORIDE 9 MG/ML
1000 INJECTION, SOLUTION INTRAVENOUS
Refills: 0 | Status: DISCONTINUED | OUTPATIENT
Start: 2021-01-19 | End: 2021-01-21

## 2021-01-19 RX ORDER — DEXTROSE 50 % IN WATER 50 %
12.5 SYRINGE (ML) INTRAVENOUS ONCE
Refills: 0 | Status: DISCONTINUED | OUTPATIENT
Start: 2021-01-19 | End: 2021-01-21

## 2021-01-19 RX ORDER — ACETAMINOPHEN 500 MG
650 TABLET ORAL ONCE
Refills: 0 | Status: DISCONTINUED | OUTPATIENT
Start: 2021-01-19 | End: 2021-01-19

## 2021-01-19 RX ORDER — ATORVASTATIN CALCIUM 80 MG/1
40 TABLET, FILM COATED ORAL AT BEDTIME
Refills: 0 | Status: DISCONTINUED | OUTPATIENT
Start: 2021-01-19 | End: 2021-01-21

## 2021-01-19 RX ORDER — FENOFIBRATE,MICRONIZED 130 MG
0 CAPSULE ORAL
Qty: 0 | Refills: 0 | DISCHARGE

## 2021-01-19 RX ORDER — AMLODIPINE BESYLATE 2.5 MG/1
0 TABLET ORAL
Qty: 0 | Refills: 0 | DISCHARGE

## 2021-01-19 RX ORDER — GLUCAGON INJECTION, SOLUTION 0.5 MG/.1ML
1 INJECTION, SOLUTION SUBCUTANEOUS ONCE
Refills: 0 | Status: DISCONTINUED | OUTPATIENT
Start: 2021-01-19 | End: 2021-01-21

## 2021-01-19 RX ORDER — DEXTROSE 50 % IN WATER 50 %
25 SYRINGE (ML) INTRAVENOUS ONCE
Refills: 0 | Status: DISCONTINUED | OUTPATIENT
Start: 2021-01-19 | End: 2021-01-19

## 2021-01-19 RX ORDER — METOPROLOL TARTRATE 50 MG
0 TABLET ORAL
Qty: 0 | Refills: 0 | DISCHARGE

## 2021-01-19 RX ORDER — DEXTROSE 50 % IN WATER 50 %
15 SYRINGE (ML) INTRAVENOUS ONCE
Refills: 0 | Status: DISCONTINUED | OUTPATIENT
Start: 2021-01-19 | End: 2021-01-21

## 2021-01-19 RX ORDER — SODIUM CHLORIDE 9 MG/ML
1000 INJECTION, SOLUTION INTRAVENOUS
Refills: 0 | Status: DISCONTINUED | OUTPATIENT
Start: 2021-01-19 | End: 2021-01-19

## 2021-01-19 RX ORDER — ACETAMINOPHEN 500 MG
1000 TABLET ORAL ONCE
Refills: 0 | Status: COMPLETED | OUTPATIENT
Start: 2021-01-19 | End: 2021-01-19

## 2021-01-19 RX ORDER — PANTOPRAZOLE SODIUM 20 MG/1
40 TABLET, DELAYED RELEASE ORAL
Refills: 0 | Status: DISCONTINUED | OUTPATIENT
Start: 2021-01-19 | End: 2021-01-21

## 2021-01-19 RX ORDER — DILTIAZEM HCL 120 MG
10 CAPSULE, EXT RELEASE 24 HR ORAL ONCE
Refills: 0 | Status: COMPLETED | OUTPATIENT
Start: 2021-01-19 | End: 2021-01-19

## 2021-01-19 RX ORDER — FENOFIBRATE,MICRONIZED 130 MG
145 CAPSULE ORAL DAILY
Refills: 0 | Status: DISCONTINUED | OUTPATIENT
Start: 2021-01-19 | End: 2021-01-21

## 2021-01-19 RX ORDER — DEXTROSE 50 % IN WATER 50 %
25 SYRINGE (ML) INTRAVENOUS ONCE
Refills: 0 | Status: DISCONTINUED | OUTPATIENT
Start: 2021-01-19 | End: 2021-01-21

## 2021-01-19 RX ORDER — INSULIN LISPRO 100/ML
VIAL (ML) SUBCUTANEOUS AT BEDTIME
Refills: 0 | Status: DISCONTINUED | OUTPATIENT
Start: 2021-01-19 | End: 2021-01-21

## 2021-01-19 RX ORDER — GLUCAGON INJECTION, SOLUTION 0.5 MG/.1ML
1 INJECTION, SOLUTION SUBCUTANEOUS ONCE
Refills: 0 | Status: DISCONTINUED | OUTPATIENT
Start: 2021-01-19 | End: 2021-01-19

## 2021-01-19 RX ORDER — OMEPRAZOLE 10 MG/1
1 CAPSULE, DELAYED RELEASE ORAL
Qty: 0 | Refills: 0 | DISCHARGE

## 2021-01-19 RX ORDER — INSULIN LISPRO 100/ML
VIAL (ML) SUBCUTANEOUS EVERY 6 HOURS
Refills: 0 | Status: DISCONTINUED | OUTPATIENT
Start: 2021-01-19 | End: 2021-01-19

## 2021-01-19 RX ORDER — ATORVASTATIN CALCIUM 80 MG/1
0 TABLET, FILM COATED ORAL
Qty: 0 | Refills: 3 | DISCHARGE

## 2021-01-19 RX ADMIN — MORPHINE SULFATE 4 MILLIGRAM(S): 50 CAPSULE, EXTENDED RELEASE ORAL at 00:24

## 2021-01-19 RX ADMIN — Medication 30 MILLIGRAM(S): at 15:42

## 2021-01-19 RX ADMIN — Medication 400 MILLIGRAM(S): at 19:59

## 2021-01-19 RX ADMIN — HEPARIN SODIUM 1200 UNIT(S)/HR: 5000 INJECTION INTRAVENOUS; SUBCUTANEOUS at 04:25

## 2021-01-19 RX ADMIN — Medication 5 MILLIGRAM(S): at 04:31

## 2021-01-19 RX ADMIN — Medication 100 MILLIGRAM(S): at 07:58

## 2021-01-19 RX ADMIN — Medication 30 MILLIGRAM(S): at 07:58

## 2021-01-19 RX ADMIN — Medication 400 MILLIGRAM(S): at 11:42

## 2021-01-19 RX ADMIN — ATORVASTATIN CALCIUM 40 MILLIGRAM(S): 80 TABLET, FILM COATED ORAL at 21:41

## 2021-01-19 RX ADMIN — Medication 10 MILLIGRAM(S): at 01:23

## 2021-01-19 RX ADMIN — Medication 30 MILLIGRAM(S): at 03:29

## 2021-01-19 RX ADMIN — HEPARIN SODIUM 1200 UNIT(S)/HR: 5000 INJECTION INTRAVENOUS; SUBCUTANEOUS at 12:16

## 2021-01-19 RX ADMIN — HEPARIN SODIUM 1200 UNIT(S)/HR: 5000 INJECTION INTRAVENOUS; SUBCUTANEOUS at 20:26

## 2021-01-19 RX ADMIN — Medication 145 MILLIGRAM(S): at 21:41

## 2021-01-19 NOTE — PROGRESS NOTE ADULT - ASSESSMENT
66 yo M hx of HTN, HLD, T2DM, presented with SOB, found to have Afib with RVR, SUSAN, transaminitis, likely mild pulm edema, and chronic right leg pain.

## 2021-01-19 NOTE — PROGRESS NOTE ADULT - PROBLEM SELECTOR PLAN 4
Patient tachycardic with elevated WBC. Afebrile  monitor off abx Patient tachycardic with elevated WBC. Afebrile  monitor off abx  - on steroids for back issue/radiculopathy; unlikely to be infection

## 2021-01-19 NOTE — PROGRESS NOTE ADULT - PROBLEM SELECTOR PLAN 6
Patient possibly has disc herniation  However no imaging done at this time  CT Lumbar spine.   PT eval  Patient was prescribed a prednisone taper outpatient, now tapering down to 10mg qday this week. Day team can call patient's PCP Dr. chay Paul [phone] to discuss what's the working diagnosis and if the prednisone was supposed to be tapered off. Patient possibly has disc herniation  MR thoracolumbar spine ordered for w/u  PT eval  Patient was prescribed a prednisone taper outpatient, now tapering down to 10mg qday this week

## 2021-01-19 NOTE — PROGRESS NOTE ADULT - PROBLEM SELECTOR PLAN 2
No baseline Cr available,   if worsen, renal consult.  send urine lytes. Obtain kidney and bladder US  day team can call patient's PCP Dr. Mikhail boothe [phone] for further info  Hold off IVF for now considering patient had pulmonary edema in the ER. No baseline Cr available; likely prerenal and improving  consider kidney and bladder US  PCP Dr. Mikhail boothe [phone] for further info  No IVF for now given some pulmonary edema 2/2 a.fib event

## 2021-01-19 NOTE — PROGRESS NOTE ADULT - PROBLEM SELECTOR PLAN 5
hepatitis panel  abdominal US with kidney and bladder ultrasound hepatitis panel  f/u abdominal US with kidney and bladder ultrasound

## 2021-01-19 NOTE — PROGRESS NOTE ADULT - PROBLEM SELECTOR PLAN 1
Patient presented with SOB, found to have Afib with RVR, Hr at 190s at presentation  s/p lopressor, diltiazem IV push.  Cardiology consulted, appreciate recs  c/w cardizem 30mg q6h, and PRN cardizem 10mg IV push.  c/w heparin drip.  NPO today for possible PIERRE Patient presented with SOB, found to have Afib with RVR, Hr at 190s at presentation  s/p lopressor, diltiazem IV push.  Cardiology consulted, appreciate recs  c/w cardizem 30mg q6h, and PRN cardizem 10mg IV push.  c/w heparin drip  PIERRE planned for today; expecting cardioversion

## 2021-01-19 NOTE — CHART NOTE - NSCHARTNOTEFT_GEN_A_CORE
Called too see patient for afib with rvr to 170s. Patient asymptomatic. SBP at 137/93. Prescribed lopressor 5 mg iv push. Patient refused and refusing further treatment. Finding discussed with attending Dr. Verma and cardiology fellow Dr. Zepeda.  Lewis Peña, PGY1  Internal Medicine  [phone] Called too see patient for afib with rvr to 170s. Patient asymptomatic. SBP at 137/93. Prescribed lopressor 5 mg iv push. Patient refused and refusing further treatment. Finding discussed with attending Dr. Verma and cardiology fellow Dr. Zepeda. Will attempt to persuade the patient again.  Lewis Peña, PGY1  Internal Medicine  [phone] Called too see patient for afib with rvr to 170s. Patient asymptomatic. SBP at 137/93. Prescribed lopressor 5 mg iv push. Patient refused and refusing further treatment. Finding discussed with attending Dr. Verma and cardiology fellow Dr. Zepeda. Will attempt to discuss with patient again.  Lewis Peña, PGY1  Internal Medicine  [phone]

## 2021-01-19 NOTE — PROGRESS NOTE ADULT - SUBJECTIVE AND OBJECTIVE BOX
PROGRESS NOTE:     CONTACT INFO:  Winston Costa MD (Resident Physician - PGY-1 - Internal Medicine)  [email or web address]  Pager: [phone] (any site) or [phone] ([facility] only)    Patient is a 65y old  Male who presents with a chief complaint of SOB (2021 23:40)      SUBJECTIVE / OVERNIGHT EVENTS:  No acute events overnight. Patient seen and evaluated at bedside. No fever/chills.  Denies SOB at rest, chest pain, palpitations, abdominal pain, nausea/vomiting    ADDITIONAL REVIEW OF SYSTEMS:    MEDICATIONS  (STANDING):  atorvastatin 40 milliGRAM(s) Oral at bedtime  dextrose 40% Gel 15 Gram(s) Oral once  dextrose 5%. 1000 milliLiter(s) (50 mL/Hr) IV Continuous <Continuous>  dextrose 5%. 1000 milliLiter(s) (100 mL/Hr) IV Continuous <Continuous>  dextrose 50% Injectable 25 Gram(s) IV Push once  dextrose 50% Injectable 12.5 Gram(s) IV Push once  dextrose 50% Injectable 25 Gram(s) IV Push once  diltiazem    Tablet 30 milliGRAM(s) Oral every 6 hours  fenofibrate Tablet 145 milliGRAM(s) Oral daily  glucagon  Injectable 1 milliGRAM(s) IntraMuscular once  heparin  Infusion.  Unit(s)/Hr (14 mL/Hr) IV Continuous <Continuous>  insulin lispro (ADMELOG) corrective regimen sliding scale   SubCutaneous every 6 hours  metoprolol succinate  milliGRAM(s) Oral daily  pantoprazole    Tablet 40 milliGRAM(s) Oral before breakfast  predniSONE   Tablet 10 milliGRAM(s) Oral daily    MEDICATIONS  (PRN):      CAPILLARY BLOOD GLUCOSE      POCT Blood Glucose.: 152 mg/dL (2021 07:23)  POCT Blood Glucose.: 186 mg/dL (2021 15:34)    I&O's Summary    2021 07:01  -  2021 07:00  --------------------------------------------------------  IN: 0 mL / OUT: 500 mL / NET: -500 mL        PHYSICAL EXAM:  Vital Signs Last 24 Hrs  T(C): 37.2 (2021 04:28), Max: 37.3 (2021 00:30)  T(F): 98.9 (2021 04:28), Max: 99.1 (2021 00:30)  HR: 81 (2021 07:55) (64 - 180)  BP: 145/79 (2021 07:55) (98/66 - 156/136)  BP(mean): 79 (2021 23:30) (74 - 96)  RR: 17 (2021 04:28) (16 - 20)  SpO2: 93% (2021 04:28) (93% - 98%)    CONSTITUTIONAL: NAD, well-developed  RESPIRATORY: Normal respiratory effort; lungs are clear to auscultation bilaterally  CARDIOVASCULAR: Regular rate and rhythm, normal S1 and S2, no murmur/rub/gallop; No lower extremity edema; Peripheral pulses are 2+ bilaterally  ABDOMEN: Nontender to palpation, normoactive bowel sounds, no rebound/guarding; No hepatosplenomegaly  MUSCLOSKELETAL: no clubbing or cyanosis of digits; no joint swelling or tenderness to palpation  PSYCH: A+O to person, place, and time; affect appropriate    LABS:                        12.9   11.00 )-----------( 263      ( 2021 03:06 )             38.1     01-18    137  |  100  |  39<H>  ----------------------------<  195<H>  4.5   |  17<L>  |  1.75<H>    Ca    9.6      2021 18:08  Phos  6.0     -18  Mg     1.3     18    TPro  7.1  /  Alb  4.4  /  TBili  0.7  /  DBili  x   /  AST  156<H>  /  ALT  279<H>  /  AlkPhos  53  -18    PT/INR - ( 2021 18:08 )   PT: 13.0 sec;   INR: 1.09 ratio         PTT - ( 2021 03:06 )  PTT:118.8 sec      Urinalysis Basic - ( 2021 21:54 )    Color: Yellow / Appearance: Clear / S.054 / pH: x  Gluc: x / Ketone: Negative  / Bili: Negative / Urobili: Negative   Blood: x / Protein: 30 mg/dL / Nitrite: Negative   Leuk Esterase: Negative / RBC: 1 /hpf / WBC 2 /HPF   Sq Epi: x / Non Sq Epi: 1 /hpf / Bacteria: Negative          RADIOLOGY & ADDITIONAL TESTS:  Results Reviewed:   Imaging Personally Reviewed:  Electrocardiogram Personally Reviewed:    COORDINATION OF CARE:  Care Discussed with Consultants/Other Providers [Y/N]:  Prior or Outpatient Records Reviewed [Y/N]:   PROGRESS NOTE:     CONTACT INFO:  Winston Costa MD (Resident Physician - PGY-1 - Internal Medicine)  [email or web address]  Pager: [phone] (any site) or [phone] ([facility] only)    Patient is a 65y old  Male who presents with a chief complaint of SOB (2021 23:40)      SUBJECTIVE / OVERNIGHT EVENTS:  No acute events overnight. Patient seen and evaluated at bedside. Says he is feeling much better than arrival and is no longer having dyspnea. c/o severe R leg pain; neuropathic in nature and indicated as chronic w/outpatient steroids and workup underway. Requesting imaging during admission. No fever/chills.  Denies SOB at rest, chest pain, palpitations, abdominal pain, nausea/vomiting    ADDITIONAL REVIEW OF SYSTEMS:    MEDICATIONS  (STANDING):  atorvastatin 40 milliGRAM(s) Oral at bedtime  dextrose 40% Gel 15 Gram(s) Oral once  dextrose 5%. 1000 milliLiter(s) (50 mL/Hr) IV Continuous <Continuous>  dextrose 5%. 1000 milliLiter(s) (100 mL/Hr) IV Continuous <Continuous>  dextrose 50% Injectable 25 Gram(s) IV Push once  dextrose 50% Injectable 12.5 Gram(s) IV Push once  dextrose 50% Injectable 25 Gram(s) IV Push once  diltiazem    Tablet 30 milliGRAM(s) Oral every 6 hours  fenofibrate Tablet 145 milliGRAM(s) Oral daily  glucagon  Injectable 1 milliGRAM(s) IntraMuscular once  heparin  Infusion.  Unit(s)/Hr (14 mL/Hr) IV Continuous <Continuous>  insulin lispro (ADMELOG) corrective regimen sliding scale   SubCutaneous every 6 hours  metoprolol succinate  milliGRAM(s) Oral daily  pantoprazole    Tablet 40 milliGRAM(s) Oral before breakfast  predniSONE   Tablet 10 milliGRAM(s) Oral daily    MEDICATIONS  (PRN):      CAPILLARY BLOOD GLUCOSE      POCT Blood Glucose.: 152 mg/dL (2021 07:23)  POCT Blood Glucose.: 186 mg/dL (2021 15:34)    I&O's Summary    2021 07:01  -  2021 07:00  --------------------------------------------------------  IN: 0 mL / OUT: 500 mL / NET: -500 mL        PHYSICAL EXAM:  Vital Signs Last 24 Hrs  T(C): 37.2 (2021 04:28), Max: 37.3 (2021 00:30)  T(F): 98.9 (2021 04:28), Max: 99.1 (2021 00:30)  HR: 81 (2021 07:55) (64 - 180)  BP: 145/79 (2021 07:55) (98/66 - 156/136)  BP(mean): 79 (2021 23:30) (74 - 96)  RR: 17 (2021 04:28) (16 - 20)  SpO2: 93% (2021 04:28) (93% - 98%)    GENERAL: NAD, lying in bed comfortably  HEAD:  Atraumatic, Normocephalic  EYES: EOMI, PERRLA, conjunctiva and sclera clear  ENT: Moist mucous membranes  NECK: Supple, No JVD  CHEST/LUNG: Clear to auscultation bilaterally; No rales, rhonchi, wheezing, or rubs. Unlabored respirations  HEART: Tachycardia w/irregularly irregular rhythm; No murmurs, rubs, or gallops  ABDOMEN: Bowel sounds present; Soft, Nontender, Nondistended. No hepatomegaly  EXTREMITIES:  2+ Peripheral Pulses, brisk capillary refill. No clubbing, cyanosis, or edema  NERVOUS SYSTEM:  Alert & Oriented X3, speech clear. RLE has full strength and ROM; but significant subjective pain  SKIN: No rashes or lesions    LABS:                        12.9   11.00 )-----------( 263      ( 2021 03:06 )             38.1     01-18    137  |  100  |  39<H>  ----------------------------<  195<H>  4.5   |  17<L>  |  1.75<H>    Ca    9.6      2021 18:08  Phos  6.0       Mg     1.3         TPro  7.1  /  Alb  4.4  /  TBili  0.7  /  DBili  x   /  AST  156<H>  /  ALT  279<H>  /  AlkPhos  53  -18    PT/INR - ( 2021 18:08 )   PT: 13.0 sec;   INR: 1.09 ratio         PTT - ( 2021 03:06 )  PTT:118.8 sec      Urinalysis Basic - ( 2021 21:54 )    Color: Yellow / Appearance: Clear / S.054 / pH: x  Gluc: x / Ketone: Negative  / Bili: Negative / Urobili: Negative   Blood: x / Protein: 30 mg/dL / Nitrite: Negative   Leuk Esterase: Negative / RBC: 1 /hpf / WBC 2 /HPF   Sq Epi: x / Non Sq Epi: 1 /hpf / Bacteria: Negative          RADIOLOGY & ADDITIONAL TESTS:  Results Reviewed:   Imaging Personally Reviewed:  Electrocardiogram Personally Reviewed:    COORDINATION OF CARE:  Care Discussed with Consultants/Other Providers [Y/N]:  Prior or Outpatient Records Reviewed [Y/N]:

## 2021-01-19 NOTE — PRE-ANESTHESIA EVALUATION ADULT - NSANTHOSAYNRD_GEN_A_CORE
No. DRISS screening performed.  STOP BANG Legend: 0-2 = LOW Risk; 3-4 = INTERMEDIATE Risk; 5-8 = HIGH Risk

## 2021-01-19 NOTE — ED PROCEDURE NOTE - PROCEDURE ADDITIONAL DETAILS
Emergency Department Focused Ultrasound performed at patient's bedside for educational purposes. The study will have a follow up study performed or was performed in the direct supervision of an ultrasound trained attending. f/u cta chest

## 2021-01-20 LAB
A1C WITH ESTIMATED AVERAGE GLUCOSE RESULT: 7.4 % — HIGH (ref 4–5.6)
ANION GAP SERPL CALC-SCNC: 11 MMOL/L — SIGNIFICANT CHANGE UP (ref 5–17)
APTT BLD: 39.5 SEC — HIGH (ref 27.5–35.5)
APTT BLD: 71.2 SEC — HIGH (ref 27.5–35.5)
BASOPHILS # BLD AUTO: 0.01 K/UL — SIGNIFICANT CHANGE UP (ref 0–0.2)
BASOPHILS NFR BLD AUTO: 0.2 % — SIGNIFICANT CHANGE UP (ref 0–2)
BUN SERPL-MCNC: 29 MG/DL — HIGH (ref 7–23)
CALCIUM SERPL-MCNC: 8.3 MG/DL — LOW (ref 8.4–10.5)
CHLORIDE SERPL-SCNC: 102 MMOL/L — SIGNIFICANT CHANGE UP (ref 96–108)
CO2 SERPL-SCNC: 26 MMOL/L — SIGNIFICANT CHANGE UP (ref 22–31)
CREAT SERPL-MCNC: 1.23 MG/DL — SIGNIFICANT CHANGE UP (ref 0.5–1.3)
EOSINOPHIL # BLD AUTO: 0.09 K/UL — SIGNIFICANT CHANGE UP (ref 0–0.5)
EOSINOPHIL NFR BLD AUTO: 1.7 % — SIGNIFICANT CHANGE UP (ref 0–6)
ESTIMATED AVERAGE GLUCOSE: 166 MG/DL — HIGH (ref 68–114)
GLUCOSE BLDC GLUCOMTR-MCNC: 121 MG/DL — HIGH (ref 70–99)
GLUCOSE BLDC GLUCOMTR-MCNC: 195 MG/DL — HIGH (ref 70–99)
GLUCOSE SERPL-MCNC: 116 MG/DL — HIGH (ref 70–99)
HCT VFR BLD CALC: 35.2 % — LOW (ref 39–50)
HCT VFR BLD CALC: 40.6 % — SIGNIFICANT CHANGE UP (ref 39–50)
HGB BLD-MCNC: 11.5 G/DL — LOW (ref 13–17)
HGB BLD-MCNC: 13.4 G/DL — SIGNIFICANT CHANGE UP (ref 13–17)
IMM GRANULOCYTES NFR BLD AUTO: 0.4 % — SIGNIFICANT CHANGE UP (ref 0–1.5)
LYMPHOCYTES # BLD AUTO: 1.15 K/UL — SIGNIFICANT CHANGE UP (ref 1–3.3)
LYMPHOCYTES # BLD AUTO: 22.3 % — SIGNIFICANT CHANGE UP (ref 13–44)
MAGNESIUM SERPL-MCNC: 1.6 MG/DL — SIGNIFICANT CHANGE UP (ref 1.6–2.6)
MCHC RBC-ENTMCNC: 30.2 PG — SIGNIFICANT CHANGE UP (ref 27–34)
MCHC RBC-ENTMCNC: 30.3 PG — SIGNIFICANT CHANGE UP (ref 27–34)
MCHC RBC-ENTMCNC: 32.7 GM/DL — SIGNIFICANT CHANGE UP (ref 32–36)
MCHC RBC-ENTMCNC: 33 GM/DL — SIGNIFICANT CHANGE UP (ref 32–36)
MCV RBC AUTO: 91.4 FL — SIGNIFICANT CHANGE UP (ref 80–100)
MCV RBC AUTO: 92.6 FL — SIGNIFICANT CHANGE UP (ref 80–100)
MONOCYTES # BLD AUTO: 0.54 K/UL — SIGNIFICANT CHANGE UP (ref 0–0.9)
MONOCYTES NFR BLD AUTO: 10.5 % — SIGNIFICANT CHANGE UP (ref 2–14)
NEUTROPHILS # BLD AUTO: 3.35 K/UL — SIGNIFICANT CHANGE UP (ref 1.8–7.4)
NEUTROPHILS NFR BLD AUTO: 64.9 % — SIGNIFICANT CHANGE UP (ref 43–77)
NRBC # BLD: 0 /100 WBCS — SIGNIFICANT CHANGE UP (ref 0–0)
NRBC # BLD: 0 /100 WBCS — SIGNIFICANT CHANGE UP (ref 0–0)
PHOSPHATE SERPL-MCNC: 3.4 MG/DL — SIGNIFICANT CHANGE UP (ref 2.5–4.5)
PLATELET # BLD AUTO: 225 K/UL — SIGNIFICANT CHANGE UP (ref 150–400)
PLATELET # BLD AUTO: 278 K/UL — SIGNIFICANT CHANGE UP (ref 150–400)
POTASSIUM SERPL-MCNC: 3.3 MMOL/L — LOW (ref 3.5–5.3)
POTASSIUM SERPL-SCNC: 3.3 MMOL/L — LOW (ref 3.5–5.3)
RBC # BLD: 3.8 M/UL — LOW (ref 4.2–5.8)
RBC # BLD: 4.44 M/UL — SIGNIFICANT CHANGE UP (ref 4.2–5.8)
RBC # FLD: 13.2 % — SIGNIFICANT CHANGE UP (ref 10.3–14.5)
RBC # FLD: 13.4 % — SIGNIFICANT CHANGE UP (ref 10.3–14.5)
SODIUM SERPL-SCNC: 139 MMOL/L — SIGNIFICANT CHANGE UP (ref 135–145)
WBC # BLD: 5.16 K/UL — SIGNIFICANT CHANGE UP (ref 3.8–10.5)
WBC # BLD: 6.29 K/UL — SIGNIFICANT CHANGE UP (ref 3.8–10.5)
WBC # FLD AUTO: 5.16 K/UL — SIGNIFICANT CHANGE UP (ref 3.8–10.5)
WBC # FLD AUTO: 6.29 K/UL — SIGNIFICANT CHANGE UP (ref 3.8–10.5)

## 2021-01-20 PROCEDURE — 72148 MRI LUMBAR SPINE W/O DYE: CPT | Mod: 26

## 2021-01-20 PROCEDURE — 93308 TTE F-UP OR LMTD: CPT | Mod: 26

## 2021-01-20 PROCEDURE — 99233 SBSQ HOSP IP/OBS HIGH 50: CPT | Mod: GC

## 2021-01-20 PROCEDURE — 99233 SBSQ HOSP IP/OBS HIGH 50: CPT | Mod: 25

## 2021-01-20 PROCEDURE — 93454 CORONARY ARTERY ANGIO S&I: CPT | Mod: 26,59

## 2021-01-20 PROCEDURE — 72146 MRI CHEST SPINE W/O DYE: CPT | Mod: 26

## 2021-01-20 PROCEDURE — 93321 DOPPLER ECHO F-UP/LMTD STD: CPT | Mod: 26

## 2021-01-20 PROCEDURE — 99152 MOD SED SAME PHYS/QHP 5/>YRS: CPT

## 2021-01-20 RX ORDER — POTASSIUM CHLORIDE 20 MEQ
20 PACKET (EA) ORAL ONCE
Refills: 0 | Status: COMPLETED | OUTPATIENT
Start: 2021-01-20 | End: 2021-01-20

## 2021-01-20 RX ORDER — GABAPENTIN 400 MG/1
300 CAPSULE ORAL AT BEDTIME
Refills: 0 | Status: COMPLETED | OUTPATIENT
Start: 2021-01-20 | End: 2021-01-20

## 2021-01-20 RX ORDER — HEPARIN SODIUM 5000 [USP'U]/ML
6500 INJECTION INTRAVENOUS; SUBCUTANEOUS ONCE
Refills: 0 | Status: COMPLETED | OUTPATIENT
Start: 2021-01-20 | End: 2021-01-20

## 2021-01-20 RX ORDER — HEPARIN SODIUM 5000 [USP'U]/ML
3000 INJECTION INTRAVENOUS; SUBCUTANEOUS EVERY 6 HOURS
Refills: 0 | Status: DISCONTINUED | OUTPATIENT
Start: 2021-01-20 | End: 2021-01-21

## 2021-01-20 RX ORDER — HEPARIN SODIUM 5000 [USP'U]/ML
6500 INJECTION INTRAVENOUS; SUBCUTANEOUS EVERY 6 HOURS
Refills: 0 | Status: DISCONTINUED | OUTPATIENT
Start: 2021-01-20 | End: 2021-01-21

## 2021-01-20 RX ORDER — GABAPENTIN 400 MG/1
300 CAPSULE ORAL AT BEDTIME
Refills: 0 | Status: DISCONTINUED | OUTPATIENT
Start: 2021-01-20 | End: 2021-01-20

## 2021-01-20 RX ORDER — ACETAMINOPHEN 500 MG
1000 TABLET ORAL ONCE
Refills: 0 | Status: COMPLETED | OUTPATIENT
Start: 2021-01-20 | End: 2021-01-20

## 2021-01-20 RX ORDER — LIDOCAINE 4 G/100G
2 CREAM TOPICAL EVERY 24 HOURS
Refills: 0 | Status: DISCONTINUED | OUTPATIENT
Start: 2021-01-20 | End: 2021-01-21

## 2021-01-20 RX ORDER — GABAPENTIN 400 MG/1
600 CAPSULE ORAL THREE TIMES A DAY
Refills: 0 | Status: DISCONTINUED | OUTPATIENT
Start: 2021-01-20 | End: 2021-01-21

## 2021-01-20 RX ORDER — HEPARIN SODIUM 5000 [USP'U]/ML
INJECTION INTRAVENOUS; SUBCUTANEOUS
Qty: 25000 | Refills: 0 | Status: DISCONTINUED | OUTPATIENT
Start: 2021-01-20 | End: 2021-01-21

## 2021-01-20 RX ADMIN — HEPARIN SODIUM 3000 UNIT(S): 5000 INJECTION INTRAVENOUS; SUBCUTANEOUS at 18:28

## 2021-01-20 RX ADMIN — HEPARIN SODIUM 1600 UNIT(S)/HR: 5000 INJECTION INTRAVENOUS; SUBCUTANEOUS at 18:27

## 2021-01-20 RX ADMIN — HEPARIN SODIUM 6500 UNIT(S): 5000 INJECTION INTRAVENOUS; SUBCUTANEOUS at 09:46

## 2021-01-20 RX ADMIN — Medication 100 MILLIGRAM(S): at 04:57

## 2021-01-20 RX ADMIN — Medication 20 MILLIEQUIVALENT(S): at 10:37

## 2021-01-20 RX ADMIN — Medication 400 MILLIGRAM(S): at 02:00

## 2021-01-20 RX ADMIN — GABAPENTIN 600 MILLIGRAM(S): 400 CAPSULE ORAL at 23:24

## 2021-01-20 RX ADMIN — ATORVASTATIN CALCIUM 40 MILLIGRAM(S): 80 TABLET, FILM COATED ORAL at 23:24

## 2021-01-20 RX ADMIN — GABAPENTIN 300 MILLIGRAM(S): 400 CAPSULE ORAL at 16:39

## 2021-01-20 RX ADMIN — HEPARIN SODIUM 1400 UNIT(S)/HR: 5000 INJECTION INTRAVENOUS; SUBCUTANEOUS at 09:45

## 2021-01-20 RX ADMIN — Medication 20 MILLIEQUIVALENT(S): at 16:39

## 2021-01-20 RX ADMIN — PANTOPRAZOLE SODIUM 40 MILLIGRAM(S): 20 TABLET, DELAYED RELEASE ORAL at 04:58

## 2021-01-20 RX ADMIN — Medication 145 MILLIGRAM(S): at 23:24

## 2021-01-20 NOTE — PROGRESS NOTE ADULT - PROBLEM SELECTOR PLAN 1
Patient presented with SOB, found to have Afib with RVR, Hr at 190s at presentation  s/p lopressor, diltiazem IV push.  Cardiology consulted, appreciate recs  c/w cardizem 30mg q6h, and PRN cardizem 10mg IV push.  c/w heparin drip  PIERRE planned for today; expecting cardioversion Patient presented with SOB, found to have Afib with RVR, Hr at 190s at presentation  s/p lopressor, diltiazem IV push.  Cardiology consulted, appreciate recs  c/w metoprolol as per cardiology recs  c/w heparin drip for now  s/p PIERRE w/cardioversion; now in NSR

## 2021-01-20 NOTE — PROGRESS NOTE ADULT - SUBJECTIVE AND OBJECTIVE BOX
PROGRESS NOTE:     CONTACT INFO:  Winston Costa MD (Resident Physician - PGY-1 - Internal Medicine)  [email or web address]  Pager: [phone] (any site) or [phone] ([facility] only)    Patient is a 65y old  Male who presents with a chief complaint of AF (2021 08:05)      SUBJECTIVE / OVERNIGHT EVENTS:  No acute events overnight. Patient seen and evaluated at bedside. No fever/chills.  Denies SOB at rest, chest pain, palpitations, abdominal pain, nausea/vomiting    ADDITIONAL REVIEW OF SYSTEMS:    MEDICATIONS  (STANDING):  atorvastatin 40 milliGRAM(s) Oral at bedtime  dextrose 40% Gel 15 Gram(s) Oral once  dextrose 5%. 1000 milliLiter(s) (50 mL/Hr) IV Continuous <Continuous>  dextrose 5%. 1000 milliLiter(s) (100 mL/Hr) IV Continuous <Continuous>  dextrose 50% Injectable 25 Gram(s) IV Push once  dextrose 50% Injectable 12.5 Gram(s) IV Push once  dextrose 50% Injectable 25 Gram(s) IV Push once  fenofibrate Tablet 145 milliGRAM(s) Oral daily  glucagon  Injectable 1 milliGRAM(s) IntraMuscular once  heparin   Injectable 6500 Unit(s) IV Push once  heparin  Infusion.  Unit(s)/Hr (14 mL/Hr) IV Continuous <Continuous>  insulin lispro (ADMELOG) corrective regimen sliding scale   SubCutaneous three times a day before meals  insulin lispro (ADMELOG) corrective regimen sliding scale   SubCutaneous at bedtime  metoprolol succinate  milliGRAM(s) Oral daily  pantoprazole    Tablet 40 milliGRAM(s) Oral before breakfast  potassium chloride    Tablet ER 20 milliEquivalent(s) Oral once  predniSONE   Tablet 10 milliGRAM(s) Oral daily    MEDICATIONS  (PRN):  heparin   Injectable 6500 Unit(s) IV Push every 6 hours PRN For aPTT less than 40  heparin   Injectable 3000 Unit(s) IV Push every 6 hours PRN For aPTT between 40 - 57      CAPILLARY BLOOD GLUCOSE      POCT Blood Glucose.: 121 mg/dL (2021 07:23)  POCT Blood Glucose.: 170 mg/dL (2021 23:36)  POCT Blood Glucose.: 120 mg/dL (2021 16:25)  POCT Blood Glucose.: 126 mg/dL (2021 11:30)    I&O's Summary    2021 07:01  -  2021 07:00  --------------------------------------------------------  IN: 440 mL / OUT: 1200 mL / NET: -760 mL        PHYSICAL EXAM:  Vital Signs Last 24 Hrs  T(C): 36.8 (2021 07:30), Max: 37.4 (2021 20:03)  T(F): 98.2 (2021 07:30), Max: 99.3 (2021 20:03)  HR: 60 (2021 07:30) (58 - 87)  BP: 120/68 (2021 07:30) (120/68 - 137/82)  BP(mean): --  RR: 18 (2021 07:30) (17 - 18)  SpO2: 98% (2021 07:30) (93% - 98%)    CONSTITUTIONAL: NAD, well-developed  RESPIRATORY: Normal respiratory effort; lungs are clear to auscultation bilaterally  CARDIOVASCULAR: Regular rate and rhythm, normal S1 and S2, no murmur/rub/gallop; No lower extremity edema; Peripheral pulses are 2+ bilaterally  ABDOMEN: Nontender to palpation, normoactive bowel sounds, no rebound/guarding; No hepatosplenomegaly  MUSCLOSKELETAL: no clubbing or cyanosis of digits; no joint swelling or tenderness to palpation  PSYCH: A+O to person, place, and time; affect appropriate    LABS:                        11.5   5.16  )-----------( 225      ( 2021 05:35 )             35.2     01-20    139  |  102  |  29<H>  ----------------------------<  116<H>  3.3<L>   |  26  |  1.23    Ca    8.3<L>      2021 05:35  Phos  3.4     01-20  Mg     1.6     -20    TPro  7.1  /  Alb  4.4  /  TBili  0.7  /  DBili  x   /  AST  156<H>  /  ALT  279<H>  /  AlkPhos  53  -18    PT/INR - ( 2021 18:08 )   PT: 13.0 sec;   INR: 1.09 ratio         PTT - ( 2021 07:40 )  PTT:71.2 sec      Urinalysis Basic - ( 2021 21:54 )    Color: Yellow / Appearance: Clear / S.054 / pH: x  Gluc: x / Ketone: Negative  / Bili: Negative / Urobili: Negative   Blood: x / Protein: 30 mg/dL / Nitrite: Negative   Leuk Esterase: Negative / RBC: 1 /hpf / WBC 2 /HPF   Sq Epi: x / Non Sq Epi: 1 /hpf / Bacteria: Negative          RADIOLOGY & ADDITIONAL TESTS:  Results Reviewed:   Imaging Personally Reviewed:  Electrocardiogram Personally Reviewed:    COORDINATION OF CARE:  Care Discussed with Consultants/Other Providers [Y/N]:  Prior or Outpatient Records Reviewed [Y/N]:   PROGRESS NOTE:     CONTACT INFO:  Winston Costa MD (Resident Physician - PGY-1 - Internal Medicine)  [email or web address]  Pager: [phone] (any site) or [phone] ([facility] only)    Patient is a 65y old  Male who presents with a chief complaint of AF (2021 08:05)      SUBJECTIVE / OVERNIGHT EVENTS:  No acute events overnight; cardioversion w/o complication yesterday. Pt stable and doing well; now in NSR. Patient seen and evaluated at bedside. Says he is feeling significantly better in terms of breathing; but still c/o significant neuropathic R leg pain. No fever/chills. Denies SOB at rest, chest pain, palpitations, abdominal pain, nausea/vomiting    ADDITIONAL REVIEW OF SYSTEMS:    MEDICATIONS  (STANDING):  atorvastatin 40 milliGRAM(s) Oral at bedtime  dextrose 40% Gel 15 Gram(s) Oral once  dextrose 5%. 1000 milliLiter(s) (50 mL/Hr) IV Continuous <Continuous>  dextrose 5%. 1000 milliLiter(s) (100 mL/Hr) IV Continuous <Continuous>  dextrose 50% Injectable 25 Gram(s) IV Push once  dextrose 50% Injectable 12.5 Gram(s) IV Push once  dextrose 50% Injectable 25 Gram(s) IV Push once  fenofibrate Tablet 145 milliGRAM(s) Oral daily  glucagon  Injectable 1 milliGRAM(s) IntraMuscular once  heparin   Injectable 6500 Unit(s) IV Push once  heparin  Infusion.  Unit(s)/Hr (14 mL/Hr) IV Continuous <Continuous>  insulin lispro (ADMELOG) corrective regimen sliding scale   SubCutaneous three times a day before meals  insulin lispro (ADMELOG) corrective regimen sliding scale   SubCutaneous at bedtime  metoprolol succinate  milliGRAM(s) Oral daily  pantoprazole    Tablet 40 milliGRAM(s) Oral before breakfast  potassium chloride    Tablet ER 20 milliEquivalent(s) Oral once  predniSONE   Tablet 10 milliGRAM(s) Oral daily    MEDICATIONS  (PRN):  heparin   Injectable 6500 Unit(s) IV Push every 6 hours PRN For aPTT less than 40  heparin   Injectable 3000 Unit(s) IV Push every 6 hours PRN For aPTT between 40 - 57      CAPILLARY BLOOD GLUCOSE      POCT Blood Glucose.: 121 mg/dL (2021 07:23)  POCT Blood Glucose.: 170 mg/dL (2021 23:36)  POCT Blood Glucose.: 120 mg/dL (2021 16:25)  POCT Blood Glucose.: 126 mg/dL (2021 11:30)    I&O's Summary    2021 07:01  -  2021 07:00  --------------------------------------------------------  IN: 440 mL / OUT: 1200 mL / NET: -760 mL        PHYSICAL EXAM:  Vital Signs Last 24 Hrs  T(C): 36.8 (2021 07:30), Max: 37.4 (2021 20:03)  T(F): 98.2 (2021 07:30), Max: 99.3 (2021 20:03)  HR: 60 (2021 07:30) (58 - 87)  BP: 120/68 (2021 07:30) (120/68 - 137/82)  BP(mean): --  RR: 18 (2021 07:30) (17 - 18)  SpO2: 98% (2021 07:30) (93% - 98%)    GENERAL: NAD, lying in bed comfortably  HEAD:  Atraumatic, Normocephalic  EYES: EOMI, PERRLA, conjunctiva and sclera clear  ENT: Moist mucous membranes  NECK: Supple, No JVD  CHEST/LUNG: Clear to auscultation bilaterally; No rales, rhonchi, wheezing, or rubs. Unlabored respirations  HEART: RRR; No murmurs, rubs, or gallops  ABDOMEN: Bowel sounds presentx4; Soft, Nontender, Nondistended. No hepatomegaly  EXTREMITIES:  2+ Peripheral Pulses, brisk capillary refill. No clubbing, cyanosis, or edema  NERVOUS SYSTEM:  Alert & Oriented X3, speech clear. RLE has full strength and ROM; but significant subjective pain on posterior calf and thigh  SKIN: No rashes or lesions    LABS:                        11.5   5.16  )-----------( 225      ( 2021 05:35 )             35.2         139  |  102  |  29<H>  ----------------------------<  116<H>  3.3<L>   |  26  |  1.23    Ca    8.3<L>      2021 05:35  Phos  3.4       Mg     1.6         TPro  7.1  /  Alb  4.4  /  TBili  0.7  /  DBili  x   /  AST  156<H>  /  ALT  279<H>  /  AlkPhos  53      PT/INR - ( 2021 18:08 )   PT: 13.0 sec;   INR: 1.09 ratio         PTT - ( 2021 07:40 )  PTT:71.2 sec      Urinalysis Basic - ( 2021 21:54 )    Color: Yellow / Appearance: Clear / S.054 / pH: x  Gluc: x / Ketone: Negative  / Bili: Negative / Urobili: Negative   Blood: x / Protein: 30 mg/dL / Nitrite: Negative   Leuk Esterase: Negative / RBC: 1 /hpf / WBC 2 /HPF   Sq Epi: x / Non Sq Epi: 1 /hpf / Bacteria: Negative          RADIOLOGY & ADDITIONAL TESTS:  Results Reviewed:   Imaging Personally Reviewed:  Electrocardiogram Personally Reviewed:    COORDINATION OF CARE:  Care Discussed with Consultants/Other Providers [Y/N]:  Prior or Outpatient Records Reviewed [Y/N]:

## 2021-01-20 NOTE — PROGRESS NOTE ADULT - PROBLEM SELECTOR PLAN 4
Patient tachycardic with elevated WBC. Afebrile  monitor off abx  - on steroids for back issue/radiculopathy; unlikely to be infection

## 2021-01-20 NOTE — PROGRESS NOTE ADULT - ASSESSMENT
65M HTN, HLD, DM2 c/o dyspnea and presyncope found to have new AF w/ RVR.    #New AF w/ RVR  -S/p PIERRE/DCCV yest  -C/w tele monitoring; in NSR now  -Maintain lytes WNL  -TFTs WNL  -C/w hep gtt for now  -Plan for DOAC at time of d/c  -C/w metop 100mg daily    #Mild LV/RV dysfxn  -Suspect 2/2 rapid AF  -Repeat TTE today while in NSR  -If LV is depressed, will consider LHC, although waiting for repeat while in NSR for some time is an option    #We will follow    Lazaro Melendez MD  Cardiology Fellow  [phone]  All Cardiology service information can be found 24/7 on [email or web address], password: [employer] 65M HTN, HLD, DM2 c/o dyspnea and presyncope found to have new AF w/ RVR.    #New AF w/ RVR  -S/p PIERRE/DCCV yest  -C/w tele monitoring; in NSR now  -Maintain lytes WNL  -TFTs WNL  -C/w hep gtt for now  -Plan for DOAC (based on insurance apixaban vs rivaroxaban) at time of d/c  -C/w metop 100mg daily    #Mild LV/RV dysfxn  -Suspect 2/2 rapid AF  -Repeat TTE still with depressed LV function  -[facility] today to rule out ischemic cardiomyopathy    #We will follow    Lazaro Melendez MD  Cardiology Fellow  [phone]  All Cardiology service information can be found 24/7 on [email or web address], password: [email or web address] 65M HTN, HLD, DM2 c/o dyspnea and presyncope found to have new AF w/ RVR.    #New AF w/ RVR  -S/p PIERRE/DCCV yest  -C/w tele monitoring; in NSR now  -Maintain lytes WNL  -TFTs WNL  -C/w hep gtt for now  -Plan for DOAC (based on insurance apixaban vs rivaroxaban) at time of d/c  -C/w metop 100mg daily    #Mild LV/RV dysfxn  -Suspect 2/2 rapid AF  -Repeat TTE still with depressed LV function  -[facility] today to rule out ischemic cardiomyopathy    #We will follow    Lazaro Melendez MD  Cardiology Fellow  [phone]  All Cardiology service information can be found 24/7 on [email or web address], password: [email or web address]    Patient seen and examined with the fellow, the note above has been edited to reflect my independent history, physical exam, assessment and plan.      Casa Chiang MD, PhD  Cardiology Attending  [facility]    For day time coverage Mon-Fri see Non-Service Consult Attending on [email or web address], password: [email or web address][employer] daytime weekends covered by general cardiology consult service attending.)

## 2021-01-20 NOTE — PROGRESS NOTE ADULT - PROBLEM SELECTOR PLAN 6
Patient possibly has disc herniation  MR thoracolumbar spine ordered for w/u  PT eval  Patient was prescribed a prednisone taper outpatient, now tapering down to 10mg qday this week Patient possibly has disc herniation  MR thoracolumbar spine ordered for w/u  PT eval  Patient was prescribed a prednisone taper outpatient, now tapering down to 10mg qday this week  Lidocane patches  Gabapentin 600mg qhs

## 2021-01-20 NOTE — PROGRESS NOTE ADULT - PROBLEM SELECTOR PLAN 8
C/w metoprolol, cardizem for tachycardia  hold off amlodipine for now, can restart if bp goes high C/w metoprolol  hold off amlodipine for now, can restart if bp goes high

## 2021-01-20 NOTE — PROGRESS NOTE ADULT - ASSESSMENT
66 yo M hx of HTN, HLD, T2DM, presented with SOB, found to have Afib with RVR, SUSAN, transaminitis, likely mild pulm edema, and chronic right leg pain. 66 yo M hx of HTN, HLD, T2DM, presented with SOB, found to have Afib with RVR, SUSAN, transaminitis, likely mild pulm edema, and chronic right leg pain. S/P PIERRE cardioversion and return to NSR. Awaiting MRI of spine.

## 2021-01-20 NOTE — PROGRESS NOTE ADULT - SUBJECTIVE AND OBJECTIVE BOX
Patient seen and examined at bedside.    Overnight Events: NAEO. This AM, pt denies any complaints. Feels much improved now that he is in sinus rhythm.    PIERRE yest w/ mild LV/RV dysfxn.    Review Of Systems: No chest pain, shortness of breath, or palpitations            Current Meds:  atorvastatin 40 milliGRAM(s) Oral at bedtime  dextrose 40% Gel 15 Gram(s) Oral once  dextrose 5%. 1000 milliLiter(s) IV Continuous <Continuous>  dextrose 5%. 1000 milliLiter(s) IV Continuous <Continuous>  dextrose 50% Injectable 25 Gram(s) IV Push once  dextrose 50% Injectable 12.5 Gram(s) IV Push once  dextrose 50% Injectable 25 Gram(s) IV Push once  fenofibrate Tablet 145 milliGRAM(s) Oral daily  glucagon  Injectable 1 milliGRAM(s) IntraMuscular once  heparin  Infusion.  Unit(s)/Hr IV Continuous <Continuous>  insulin lispro (ADMELOG) corrective regimen sliding scale   SubCutaneous three times a day before meals  insulin lispro (ADMELOG) corrective regimen sliding scale   SubCutaneous at bedtime  metoprolol succinate  milliGRAM(s) Oral daily  pantoprazole    Tablet 40 milliGRAM(s) Oral before breakfast  predniSONE   Tablet 10 milliGRAM(s) Oral daily      Vitals:  T(F): 97.9 (01-20), Max: 99.3 (01-19)  HR: 58 (01-20) (58 - 87)  BP: 121/74 (01-20) (121/74 - 137/82)  RR: 17 (01-20)  SpO2: 96% (01-20)  I&O's Summary    19 Jan 2021 07:01  -  20 Jan 2021 07:00  --------------------------------------------------------  IN: 440 mL / OUT: 1200 mL / NET: -760 mL        Physical Exam:  Gen: NAD.  HEENT: NCAT. PERRLA b/l.  Neck: No JVP elev.  CV: Normal S1, S2. RRR. No MRG.  Chest: CTAB. No WRR.  Abd: +BSx4. Soft. NTND.  Ext: No LE edema.  Skin: No cyanosis.                          11.5   5.16  )-----------( 225      ( 20 Jan 2021 05:35 )             35.2     01-20    139  |  102  |  29<H>  ----------------------------<  116<H>  3.3<L>   |  26  |  1.23    Ca    8.3<L>      20 Jan 2021 05:35  Phos  3.4     01-20  Mg     1.6     01-20    TPro  7.1  /  Alb  4.4  /  TBili  0.7  /  DBili  x   /  AST  156<H>  /  ALT  279<H>  /  AlkPhos  53  01-18    PT/INR - ( 18 Jan 2021 18:08 )   PT: 13.0 sec;   INR: 1.09 ratio         PTT - ( 19 Jan 2021 20:03 )  PTT:93.1 sec  CARDIAC MARKERS ( 18 Jan 2021 19:56 )  21 ng/L / x     / x     / x     / x     / x      CARDIAC MARKERS ( 18 Jan 2021 18:08 )  24 ng/L / x     / x     / x     / x     / x          Serum Pro-Brain Natriuretic Peptide: 5876 pg/mL (01-18 @ 18:08)    Echo: < from: Transesophageal Echocardiogram (01.19.21 @ 09:26) >  1. Mildly dilated left atrium.  LA volume index = 36 cc/m2.  No left atrial or left atrial appendage thrombus. Mild  spontaneous echo contrast seen. Borderline normal left  atrial appendage function (maximumvelocityabout 40 cm/s).  2. Patient in atrial fibrillation with rapid ventricular  response; ejection fraction varies with R-R interval.  grossly mild global left ventricular systolic dysfunction.  3. Right ventricular systolic function varies with R-R  interval; normal right size with grossly mildly decreased  right ventricular systolic function.    Interpretation of Telemetry: NSR Patient seen and examined at bedside.    Overnight Events: NAEO. This AM, pt denies any complaints. Feels much improved now that he is in sinus rhythm.    PIERRE yest w/ mild LV/RV dysfxn.  This AM PIERRE with mild LV dysfxn, will check [facility]    Review Of Systems: No chest pain, shortness of breath, or palpitations            Current Meds:  atorvastatin 40 milliGRAM(s) Oral at bedtime  dextrose 40% Gel 15 Gram(s) Oral once  dextrose 5%. 1000 milliLiter(s) IV Continuous <Continuous>  dextrose 5%. 1000 milliLiter(s) IV Continuous <Continuous>  dextrose 50% Injectable 25 Gram(s) IV Push once  dextrose 50% Injectable 12.5 Gram(s) IV Push once  dextrose 50% Injectable 25 Gram(s) IV Push once  fenofibrate Tablet 145 milliGRAM(s) Oral daily  glucagon  Injectable 1 milliGRAM(s) IntraMuscular once  heparin  Infusion.  Unit(s)/Hr IV Continuous <Continuous>  insulin lispro (ADMELOG) corrective regimen sliding scale   SubCutaneous three times a day before meals  insulin lispro (ADMELOG) corrective regimen sliding scale   SubCutaneous at bedtime  metoprolol succinate  milliGRAM(s) Oral daily  pantoprazole    Tablet 40 milliGRAM(s) Oral before breakfast  predniSONE   Tablet 10 milliGRAM(s) Oral daily      Vitals:  T(F): 97.9 (01-20), Max: 99.3 (01-19)  HR: 58 (01-20) (58 - 87)  BP: 121/74 (01-20) (121/74 - 137/82)  RR: 17 (01-20)  SpO2: 96% (01-20)  I&O's Summary    19 Jan 2021 07:01  -  20 Jan 2021 07:00  --------------------------------------------------------  IN: 440 mL / OUT: 1200 mL / NET: -760 mL        Physical Exam:  Gen: NAD.  HEENT: NCAT. PERRLA b/l.  Neck: No JVP elev.  CV: Normal S1, S2. RRR. No MRG.  Chest: CTAB. No WRR.  Abd: +BSx4. Soft. NTND.  Ext: No LE edema.  Skin: No cyanosis.                          11.5   5.16  )-----------( 225      ( 20 Jan 2021 05:35 )             35.2     01-20    139  |  102  |  29<H>  ----------------------------<  116<H>  3.3<L>   |  26  |  1.23    Ca    8.3<L>      20 Jan 2021 05:35  Phos  3.4     01-20  Mg     1.6     01-20    TPro  7.1  /  Alb  4.4  /  TBili  0.7  /  DBili  x   /  AST  156<H>  /  ALT  279<H>  /  AlkPhos  53  01-18    PT/INR - ( 18 Jan 2021 18:08 )   PT: 13.0 sec;   INR: 1.09 ratio         PTT - ( 19 Jan 2021 20:03 )  PTT:93.1 sec  CARDIAC MARKERS ( 18 Jan 2021 19:56 )  21 ng/L / x     / x     / x     / x     / x      CARDIAC MARKERS ( 18 Jan 2021 18:08 )  24 ng/L / x     / x     / x     / x     / x          Serum Pro-Brain Natriuretic Peptide: 5876 pg/mL (01-18 @ 18:08)    Echo: < from: Transesophageal Echocardiogram (01.19.21 @ 09:26) >  1. Mildly dilated left atrium.  LA volume index = 36 cc/m2.  No left atrial or left atrial appendage thrombus. Mild  spontaneous echo contrast seen. Borderline normal left  atrial appendage function (maximumvelocityabout 40 cm/s).  2. Patient in atrial fibrillation with rapid ventricular  response; ejection fraction varies with R-R interval.  grossly mild global left ventricular systolic dysfunction.  3. Right ventricular systolic function varies with R-R  interval; normal right size with grossly mildly decreased  right ventricular systolic function.    Interpretation of Telemetry: NSR

## 2021-01-20 NOTE — PROVIDER CONTACT NOTE (OTHER) - SITUATION
pt refused AM meds
Pt. was on a heparin drip.  Pt. went for cardiac cath.  Questioning when to restart heparin drip

## 2021-01-20 NOTE — PROGRESS NOTE ADULT - PROBLEM SELECTOR PLAN 2
No baseline Cr available; likely prerenal and improving  consider kidney and bladder US  PCP Dr. Mikhail boothe [phone] for further info  No IVF for now given some pulmonary edema 2/2 a.fib event

## 2021-01-21 ENCOUNTER — TRANSCRIPTION ENCOUNTER (OUTPATIENT)
Age: 66
End: 2021-01-21

## 2021-01-21 VITALS
DIASTOLIC BLOOD PRESSURE: 82 MMHG | OXYGEN SATURATION: 96 % | HEART RATE: 67 BPM | RESPIRATION RATE: 18 BRPM | SYSTOLIC BLOOD PRESSURE: 149 MMHG | TEMPERATURE: 98 F

## 2021-01-21 LAB
ALBUMIN SERPL ELPH-MCNC: 3.1 G/DL — LOW (ref 3.3–5)
ALP SERPL-CCNC: 38 U/L — LOW (ref 40–120)
ALT FLD-CCNC: 506 U/L — HIGH (ref 10–45)
ANION GAP SERPL CALC-SCNC: 14 MMOL/L — SIGNIFICANT CHANGE UP (ref 5–17)
APTT BLD: 62.2 SEC — HIGH (ref 27.5–35.5)
APTT BLD: 70.9 SEC — HIGH (ref 27.5–35.5)
AST SERPL-CCNC: 201 U/L — HIGH (ref 10–40)
BILIRUB SERPL-MCNC: 0.3 MG/DL — SIGNIFICANT CHANGE UP (ref 0.2–1.2)
BUN SERPL-MCNC: 22 MG/DL — SIGNIFICANT CHANGE UP (ref 7–23)
CALCIUM SERPL-MCNC: 8.7 MG/DL — SIGNIFICANT CHANGE UP (ref 8.4–10.5)
CHLORIDE SERPL-SCNC: 105 MMOL/L — SIGNIFICANT CHANGE UP (ref 96–108)
CO2 SERPL-SCNC: 22 MMOL/L — SIGNIFICANT CHANGE UP (ref 22–31)
CREAT SERPL-MCNC: 1.15 MG/DL — SIGNIFICANT CHANGE UP (ref 0.5–1.3)
GLUCOSE BLDC GLUCOMTR-MCNC: 124 MG/DL — HIGH (ref 70–99)
GLUCOSE BLDC GLUCOMTR-MCNC: 130 MG/DL — HIGH (ref 70–99)
GLUCOSE BLDC GLUCOMTR-MCNC: 196 MG/DL — HIGH (ref 70–99)
GLUCOSE SERPL-MCNC: 197 MG/DL — HIGH (ref 70–99)
HCT VFR BLD CALC: 35.6 % — LOW (ref 39–50)
HGB BLD-MCNC: 11.2 G/DL — LOW (ref 13–17)
MAGNESIUM SERPL-MCNC: 1.6 MG/DL — SIGNIFICANT CHANGE UP (ref 1.6–2.6)
MCHC RBC-ENTMCNC: 29.6 PG — SIGNIFICANT CHANGE UP (ref 27–34)
MCHC RBC-ENTMCNC: 31.5 GM/DL — LOW (ref 32–36)
MCV RBC AUTO: 93.9 FL — SIGNIFICANT CHANGE UP (ref 80–100)
NRBC # BLD: 0 /100 WBCS — SIGNIFICANT CHANGE UP (ref 0–0)
PHOSPHATE SERPL-MCNC: 2.4 MG/DL — LOW (ref 2.5–4.5)
PLATELET # BLD AUTO: 228 K/UL — SIGNIFICANT CHANGE UP (ref 150–400)
POTASSIUM SERPL-MCNC: 3.3 MMOL/L — LOW (ref 3.5–5.3)
POTASSIUM SERPL-SCNC: 3.3 MMOL/L — LOW (ref 3.5–5.3)
PROT SERPL-MCNC: 5.8 G/DL — LOW (ref 6–8.3)
RBC # BLD: 3.79 M/UL — LOW (ref 4.2–5.8)
RBC # FLD: 13 % — SIGNIFICANT CHANGE UP (ref 10.3–14.5)
SODIUM SERPL-SCNC: 141 MMOL/L — SIGNIFICANT CHANGE UP (ref 135–145)
WBC # BLD: 4.89 K/UL — SIGNIFICANT CHANGE UP (ref 3.8–10.5)
WBC # FLD AUTO: 4.89 K/UL — SIGNIFICANT CHANGE UP (ref 3.8–10.5)

## 2021-01-21 PROCEDURE — 84100 ASSAY OF PHOSPHORUS: CPT

## 2021-01-21 PROCEDURE — 99239 HOSP IP/OBS DSCHRG MGMT >30: CPT | Mod: GC

## 2021-01-21 PROCEDURE — 83605 ASSAY OF LACTIC ACID: CPT

## 2021-01-21 PROCEDURE — 92960 CARDIOVERSION ELECTRIC EXT: CPT

## 2021-01-21 PROCEDURE — 82962 GLUCOSE BLOOD TEST: CPT

## 2021-01-21 PROCEDURE — 85025 COMPLETE CBC W/AUTO DIFF WBC: CPT

## 2021-01-21 PROCEDURE — 93306 TTE W/DOPPLER COMPLETE: CPT

## 2021-01-21 PROCEDURE — 83735 ASSAY OF MAGNESIUM: CPT

## 2021-01-21 PROCEDURE — 85610 PROTHROMBIN TIME: CPT

## 2021-01-21 PROCEDURE — 85018 HEMOGLOBIN: CPT

## 2021-01-21 PROCEDURE — 93321 DOPPLER ECHO F-UP/LMTD STD: CPT

## 2021-01-21 PROCEDURE — 86769 SARS-COV-2 COVID-19 ANTIBODY: CPT

## 2021-01-21 PROCEDURE — 82435 ASSAY OF BLOOD CHLORIDE: CPT

## 2021-01-21 PROCEDURE — 72146 MRI CHEST SPINE W/O DYE: CPT

## 2021-01-21 PROCEDURE — U0005: CPT

## 2021-01-21 PROCEDURE — 84295 ASSAY OF SERUM SODIUM: CPT

## 2021-01-21 PROCEDURE — 84443 ASSAY THYROID STIM HORMONE: CPT

## 2021-01-21 PROCEDURE — 99152 MOD SED SAME PHYS/QHP 5/>YRS: CPT

## 2021-01-21 PROCEDURE — 85014 HEMATOCRIT: CPT

## 2021-01-21 PROCEDURE — 85730 THROMBOPLASTIN TIME PARTIAL: CPT

## 2021-01-21 PROCEDURE — 93454 CORONARY ARTERY ANGIO S&I: CPT

## 2021-01-21 PROCEDURE — 83036 HEMOGLOBIN GLYCOSYLATED A1C: CPT

## 2021-01-21 PROCEDURE — C1887: CPT

## 2021-01-21 PROCEDURE — 82330 ASSAY OF CALCIUM: CPT

## 2021-01-21 PROCEDURE — 93005 ELECTROCARDIOGRAM TRACING: CPT

## 2021-01-21 PROCEDURE — 72148 MRI LUMBAR SPINE W/O DYE: CPT

## 2021-01-21 PROCEDURE — C1769: CPT

## 2021-01-21 PROCEDURE — 84484 ASSAY OF TROPONIN QUANT: CPT

## 2021-01-21 PROCEDURE — 99291 CRITICAL CARE FIRST HOUR: CPT | Mod: 25

## 2021-01-21 PROCEDURE — 99232 SBSQ HOSP IP/OBS MODERATE 35: CPT

## 2021-01-21 PROCEDURE — C1894: CPT

## 2021-01-21 PROCEDURE — 82803 BLOOD GASES ANY COMBINATION: CPT

## 2021-01-21 PROCEDURE — 80048 BASIC METABOLIC PNL TOTAL CA: CPT

## 2021-01-21 PROCEDURE — 96374 THER/PROPH/DIAG INJ IV PUSH: CPT | Mod: XU

## 2021-01-21 PROCEDURE — 85379 FIBRIN DEGRADATION QUANT: CPT

## 2021-01-21 PROCEDURE — 96375 TX/PRO/DX INJ NEW DRUG ADDON: CPT | Mod: XU

## 2021-01-21 PROCEDURE — 71275 CT ANGIOGRAPHY CHEST: CPT

## 2021-01-21 PROCEDURE — U0003: CPT

## 2021-01-21 PROCEDURE — 84132 ASSAY OF SERUM POTASSIUM: CPT

## 2021-01-21 PROCEDURE — 80053 COMPREHEN METABOLIC PANEL: CPT

## 2021-01-21 PROCEDURE — 81001 URINALYSIS AUTO W/SCOPE: CPT

## 2021-01-21 PROCEDURE — 93308 TTE F-UP OR LMTD: CPT

## 2021-01-21 PROCEDURE — 82947 ASSAY GLUCOSE BLOOD QUANT: CPT

## 2021-01-21 PROCEDURE — 83880 ASSAY OF NATRIURETIC PEPTIDE: CPT

## 2021-01-21 PROCEDURE — 93312 ECHO TRANSESOPHAGEAL: CPT

## 2021-01-21 PROCEDURE — 76700 US EXAM ABDOM COMPLETE: CPT

## 2021-01-21 PROCEDURE — 85027 COMPLETE CBC AUTOMATED: CPT

## 2021-01-21 PROCEDURE — 71045 X-RAY EXAM CHEST 1 VIEW: CPT

## 2021-01-21 RX ORDER — APIXABAN 2.5 MG/1
5 TABLET, FILM COATED ORAL
Refills: 0 | Status: DISCONTINUED | OUTPATIENT
Start: 2021-01-21 | End: 2021-01-21

## 2021-01-21 RX ORDER — APIXABAN 2.5 MG/1
1 TABLET, FILM COATED ORAL
Qty: 60 | Refills: 0
Start: 2021-01-21 | End: 2021-02-19

## 2021-01-21 RX ORDER — GABAPENTIN 400 MG/1
1 CAPSULE ORAL
Qty: 90 | Refills: 0
Start: 2021-01-21 | End: 2021-02-19

## 2021-01-21 RX ORDER — POTASSIUM CHLORIDE 20 MEQ
20 PACKET (EA) ORAL ONCE
Refills: 0 | Status: COMPLETED | OUTPATIENT
Start: 2021-01-21 | End: 2021-01-21

## 2021-01-21 RX ADMIN — APIXABAN 5 MILLIGRAM(S): 2.5 TABLET, FILM COATED ORAL at 11:58

## 2021-01-21 RX ADMIN — HEPARIN SODIUM 1600 UNIT(S)/HR: 5000 INJECTION INTRAVENOUS; SUBCUTANEOUS at 08:25

## 2021-01-21 RX ADMIN — GABAPENTIN 600 MILLIGRAM(S): 400 CAPSULE ORAL at 06:02

## 2021-01-21 RX ADMIN — Medication 20 MILLIEQUIVALENT(S): at 14:20

## 2021-01-21 RX ADMIN — GABAPENTIN 600 MILLIGRAM(S): 400 CAPSULE ORAL at 11:58

## 2021-01-21 RX ADMIN — Medication 100 MILLIGRAM(S): at 06:02

## 2021-01-21 RX ADMIN — LIDOCAINE 2 PATCH: 4 CREAM TOPICAL at 13:41

## 2021-01-21 RX ADMIN — PANTOPRAZOLE SODIUM 40 MILLIGRAM(S): 20 TABLET, DELAYED RELEASE ORAL at 06:02

## 2021-01-21 RX ADMIN — HEPARIN SODIUM 1600 UNIT(S)/HR: 5000 INJECTION INTRAVENOUS; SUBCUTANEOUS at 01:08

## 2021-01-21 NOTE — DISCHARGE NOTE PROVIDER - NSDCCPTREATMENT_GEN_ALL_CORE_FT
PRINCIPAL PROCEDURE  Procedure: Cardioversion with transesophageal echocardiography (PIERRE) if indicated  Findings and Treatment:       SECONDARY PROCEDURE  Procedure: MRI, spine, lumbar, without contrast  Findings and Treatment:      PRINCIPAL PROCEDURE  Procedure: Cardioversion with transesophageal echocardiography (PIERRE) if indicated  Findings and Treatment: Echo: < from: Transesophageal Echocardiogram (21 @ 09:26) >  1. Mildly dilated left atrium.  LA volume index = 36 cc/m2.  No left atrial or left atrial appendage thrombus. Mild  spontaneous echo contrast seen. Borderline normal left  atrial appendage function (maximumvelocityabout 40 cm/s).  2. Patient in atrial fibrillation with rapid ventricular  response; ejection fraction varies with R-R interval.  grossly mild global left ventricular systolic dysfunction.  3. Right ventricular systolic function varies with R-R  interval; normal right size with grossly mildly decreased  right ventricular systolic function.  Interpretation of Telemetry: NSR        SECONDARY PROCEDURE  Procedure: Left heart cardiac cath  Findings and Treatment: PROCEDURE:  --  Left coronary angiography.  --  Right coronary angiography.  TECHNIQUE: The risks and alternatives of the procedures and conscious  sedation were explained to the patient and informed consent was obtained.  Cardiac catheterization performed electively.  Local anesthetic given. Right radial artery access. Left coronary artery  angiography. The vessel was injected utilizing a catheter. Right coronary  artery angiography. The vessel was injected utilizing a catheter.  CONTRAST GIVEN: Omnipaque 25 ml.  MEDICATIONS GIVEN: Midazolam, 1 mg, IV. Fentanyl, 25 mcg, IV. Verapamil  (Isoptin, Calan, Covera), 2.5 mg, IA. Heparin, 3000 units, IV.  CORONARY VESSELS: The coronary circulation is right dominant.  LM:   --  LM: Normal.  LAD:   --  LAD: Normal.  CX:   --  Circumflex: Normal.  RCA:   --  RCA: Normal.  COMPLICATIONS: There were no complications.  DIAGNOSTIC RECOMMENDATIONS: The patient should continue with the present  medications.  Prepared and signed by  Art Lucia M.D.  Signed 2021 12:47:50  HEMODYNAMIC TABLES  Pressures:  Baseline  Pressures:  - HR: 73  Pressures:  - Rhythm:  Pressures:  -- Aortic Pressure (S/D/M): 154/89/115  Outputs:  Baseline  Outputs:  -- CALCULATIONS[age]  Outputs:  -- CALCULATIONS: Body Surface Area: 1.93  Outputs:  -- CALCULATIONS: Height in cm: 175.00  Outputs:  -- CALCULATIONS: Sex: Male  Outputs:  -- CALCULATIONS: Weight in k.30    Procedure: MRI, spine, lumbar, without contrast  Findings and Treatment: FINDINGS:  ALIGNMENT: Mild anterolisthesis at L2-L3.  VERTEBRAE: The vertebral bodies are normal in height. There is no fracture or aggressive osseous lesion.  DISCS: Multilevel disc desiccation and loss of height.  CORD: The conus medullaris terminates at L1-L2. There is no intrinsic spinal cord signal abnormality.  EVALUATION OF INDIVIDUAL LEVELS:  T2-T3: Shallow central disc protrusion. No spinal canal or neuroforaminal stenosis.  T7-T8: Central disc protrusion with ventral cord indentation. There is no spinal canal or neuroforaminal stenosis.  L2-L3: Mild anterolisthesis, Disc bulge, facet joint arthrosis, and dorsal epidural lipomatosis contribute to severe spinal canal stenosis with subtotal CSF space effacement. There is no neuroforaminal stenosis.  L3-L4: Disc bulge and bilateral facet joint arthrosis with ligamentum flavum thickening. There is superimposed left foraminal disc protrusion. There is mild spinal canal stenosis. Narrowing of the left worse than right lateral recesses. There is no right and mild left neuroforaminal stenosis.  L4-L5: Disc bulge with bilateral facet joint arthrosis and ligamentum flavum thickening. There is central annular fissure with possible shallow superimposed disc protrusion. There is no spinal canal stenosis. There is lateral recess effacement bilaterally. There is no neuroforaminal stenosis.  L5-S1: Disc bulge with superimposed right central disc protrusion. There is effacement of the right lateral recess with likely impingement of the exiting right S1 nerve root. There is no spinal canal stenosis. Mild to moderate bilateral neuroforaminal stenosis.

## 2021-01-21 NOTE — DISCHARGE NOTE PROVIDER - PROVIDER TOKENS
Condition:: cosmetic
Please Describe Your Condition:: pt presenting for microneedling rx of face - treatment 3/3\\ntolerated well in the past,\\nno adverse events
PROVIDER:[TOKEN:[[id number]],FOLLOWUP:[2 weeks]]

## 2021-01-21 NOTE — PROGRESS NOTE ADULT - PROBLEM SELECTOR PLAN 1
Patient presented with SOB, found to have Afib with RVR, Hr at 190s at presentation  s/p lopressor, diltiazem IV push.  Cardiology consulted, appreciate recs  c/w metoprolol as per cardiology recs  c/w heparin drip for now  s/p PIERRE w/cardioversion; now in NSR

## 2021-01-21 NOTE — PROGRESS NOTE ADULT - SUBJECTIVE AND OBJECTIVE BOX
Patient seen and examined at bedside.    Overnight Events: NAEO. Yest, pt had LHC w/ normal coronaries. This AM, pt somewhat upset that MDs not providing immediate reports of results. Denies any CP/dyspnea/palps.    Review Of Systems: No chest pain, shortness of breath, or palpitations            Current Meds:  atorvastatin 40 milliGRAM(s) Oral at bedtime  dextrose 40% Gel 15 Gram(s) Oral once  dextrose 5%. 1000 milliLiter(s) IV Continuous <Continuous>  dextrose 5%. 1000 milliLiter(s) IV Continuous <Continuous>  dextrose 50% Injectable 25 Gram(s) IV Push once  dextrose 50% Injectable 12.5 Gram(s) IV Push once  dextrose 50% Injectable 25 Gram(s) IV Push once  fenofibrate Tablet 145 milliGRAM(s) Oral daily  gabapentin 600 milliGRAM(s) Oral three times a day  glucagon  Injectable 1 milliGRAM(s) IntraMuscular once  heparin   Injectable 6500 Unit(s) IV Push every 6 hours PRN  heparin   Injectable 3000 Unit(s) IV Push every 6 hours PRN  heparin  Infusion.  Unit(s)/Hr IV Continuous <Continuous>  insulin lispro (ADMELOG) corrective regimen sliding scale   SubCutaneous three times a day before meals  insulin lispro (ADMELOG) corrective regimen sliding scale   SubCutaneous at bedtime  lidocaine   Patch 2 Patch Transdermal every 24 hours  metoprolol succinate  milliGRAM(s) Oral daily  pantoprazole    Tablet 40 milliGRAM(s) Oral before breakfast  predniSONE   Tablet 10 milliGRAM(s) Oral daily      Vitals:  T(F): 98.1 (01-21), Max: 98.5 (01-20)  HR: 64 (01-21) (64 - 78)  BP: 147/88 (01-21) (124/60 - 177/76)  RR: 18 (01-21)  SpO2: 97% (01-21)  I&O's Summary    20 Jan 2021 07:01  -  21 Jan 2021 07:00  --------------------------------------------------------  IN: 515 mL / OUT: 350 mL / NET: 165 mL        Physical Exam:  Gen: NAD.  HEENT: NCAT. PERRLA b/l.  Neck: No JVP elev.  CV: Normal S1, S2. RRR. No MRG.  Chest: CTAB. No WRR.  Abd: +BSx4. Soft. NTND.  Ext: No LE edema. R wrist cath site CDI. R hand neurovasc intact.  Skin: No cyanosis.                          13.4   6.29  )-----------( 278      ( 20 Jan 2021 16:16 )             40.6     01-20    139  |  102  |  29<H>  ----------------------------<  116<H>  3.3<L>   |  26  |  1.23    Ca    8.3<L>      20 Jan 2021 05:35  Phos  3.4     01-20  Mg     1.6     01-20      PTT - ( 21 Jan 2021 00:27 )  PTT:70.9 sec  CARDIAC MARKERS ( 18 Jan 2021 19:56 )  21 ng/L / x     / x     / x     / x     / x      CARDIAC MARKERS ( 18 Jan 2021 18:08 )  24 ng/L / x     / x     / x     / x     / x          Serum Pro-Brain Natriuretic Peptide: 5876 pg/mL (01-18 @ 18:08)    Cath: < from: Cardiac Cath Lab - Adult (01.20.21 @ 11:53) >  CORONARY VESSELS: The coronary circulation is right dominant.  LM:   --  LM: Normal.  LAD:   --  LAD: Normal.  CX:   --  Circumflex: Normal.  RCA:   --  RCA: Normal.  COMPLICATIONS: There were no complications.  DIAGNOSTIC RECOMMENDATIONS: The patient should continue with the present  medications.    Interpretation of Telemetry: NSR

## 2021-01-21 NOTE — PROGRESS NOTE ADULT - PROBLEM SELECTOR PLAN 2
No baseline Cr available; likely prerenal and improving  consider kidney and bladder US  PCP Dr. Mikhail boothe [phone] for further info  No IVF for now given some pulmonary edema 2/2 a.fib event

## 2021-01-21 NOTE — DISCHARGE NOTE PROVIDER - CARE PROVIDER_API CALL
Juan Mays)  Cardiology; Internal Medicine  [address], [address]  [place] [address]  Phone: [phone]  Fax: [phone]  Follow Up Time: 2 weeks

## 2021-01-21 NOTE — PROGRESS NOTE ADULT - PROBLEM SELECTOR PLAN 7
Patient is on metformin 500 at home  No prior A1C available  low SSI for now  A1c with morning lab

## 2021-01-21 NOTE — DISCHARGE NOTE PROVIDER - NSDCCAREPROVSEEN_GEN_ALL_CORE_FT
[facility] Staff Team 3  April Mack, Sindhu Garcia [facility] Team 2

## 2021-01-21 NOTE — PROGRESS NOTE ADULT - NUTRITIONAL ASSESSMENT
65M HTN, HLD, DM2 c/o dyspnea and presyncope found to have new AF w/ RVR.    #New AF w/ RVR  -S/p PIERRE/DCCV this admission w/ restoration of sinus rhythm  -C/w hep gtt for now, but plan for DOAC when no further procedures planned (based on insurance apixaban vs rivaroxaban)  -C/w metop 100mg daily    #Mild LV/RV dysfxn  -Suspect 2/2 rapid AF  -LHC w/ normal coronaies  -C/w BB, as above  -Would advise repeat TTE in 3M if sinus rhythm maintained to assess for recovery of LVEF    #No barriers to d/c from cards perspective. Pt should f/u w/ his usual cardiologist at [place]    Lazaro Melendez MD  Cardiology Fellow  [phone]  All Cardiology service information can be found 24/7 on [email or web address], password: [employer] People

## 2021-01-21 NOTE — PROGRESS NOTE ADULT - SUBJECTIVE AND OBJECTIVE BOX
Patient seen and examined at bedside.    Overnight Events: no events, issues or complaints    Review of Systems:  CONSTITUTIONAL: No weakness, fevers or chills  EYES/ENT: No visual changes;  No dysphagia  NECK: No pain or stiffness  RESPIRATORY: No cough, wheezing, hemoptysis; No shortness of breath  CARDIOVASCULAR: No chest pain or palpitations; No lower extremity edema  GASTROINTESTINAL: No abdominal or epigastric pain. No nausea, vomiting, or hematemesis; No diarrhea or constipation. No melena or hematochezia.  BACK: No back pain  GENITOURINARY: No dysuria, frequency or hematuria  NEUROLOGICAL: No numbness or weakness  SKIN: No itching, burning, rashes, or lesions   All other review of systems is negative unless indicated above.    [ ] All other systems negative  [ ] Unable to assess ROS due to    Current Meds:  atorvastatin 40 milliGRAM(s) Oral at bedtime  dextrose 40% Gel 15 Gram(s) Oral once  dextrose 5%. 1000 milliLiter(s) IV Continuous <Continuous>  dextrose 5%. 1000 milliLiter(s) IV Continuous <Continuous>  dextrose 50% Injectable 25 Gram(s) IV Push once  dextrose 50% Injectable 12.5 Gram(s) IV Push once  dextrose 50% Injectable 25 Gram(s) IV Push once  fenofibrate Tablet 145 milliGRAM(s) Oral daily  gabapentin 600 milliGRAM(s) Oral three times a day  glucagon  Injectable 1 milliGRAM(s) IntraMuscular once  heparin   Injectable 6500 Unit(s) IV Push every 6 hours PRN  heparin   Injectable 3000 Unit(s) IV Push every 6 hours PRN  heparin  Infusion.  Unit(s)/Hr IV Continuous <Continuous>  insulin lispro (ADMELOG) corrective regimen sliding scale   SubCutaneous three times a day before meals  insulin lispro (ADMELOG) corrective regimen sliding scale   SubCutaneous at bedtime  lidocaine   Patch 2 Patch Transdermal every 24 hours  metoprolol succinate  milliGRAM(s) Oral daily  pantoprazole    Tablet 40 milliGRAM(s) Oral before breakfast  predniSONE   Tablet 10 milliGRAM(s) Oral daily      PAST MEDICAL & SURGICAL HISTORY:  Diabetes    HLD (hyperlipidemia)    HTN (hypertension)    History of fusion of cervical spine  30 years ago        Vitals:  T(F): 98.1 (01-21), Max: 98.5 (01-20)  HR: 64 (01-21) (64 - 78)  BP: 147/88 (01-21) (124/60 - 177/76)  RR: 18 (01-21)  SpO2: 97% (01-21)  I&O's Summary    20 Jan 2021 07:01  -  21 Jan 2021 07:00  --------------------------------------------------------  IN: 515 mL / OUT: 350 mL / NET: 165 mL        Physical Exam:  Appearance: No acute distress; well appearing  Eyes: PERRL, EOMI, pink conjunctiva  HENT: Normal oral mucosa  Cardiovascular: RRR, S1, S2, no murmurs, rubs, or gallops; no edema; no JVD  Respiratory: Clear to auscultation bilaterally  Gastrointestinal: soft, non-tender, non-distended with normal bowel sounds  Musculoskeletal: No clubbing; no joint deformity   Neurologic: Non-focal  Lymphatic: No lymphadenopathy  Psychiatry: AAOx3, mood & affect appropriate  Skin: No rashes, ecchymoses, or cyanosis                          13.4   6.29  )-----------( 278      ( 20 Jan 2021 16:16 )             40.6     01-20    139  |  102  |  29<H>  ----------------------------<  116<H>  3.3<L>   |  26  |  1.23    Ca    8.3<L>      20 Jan 2021 05:35  Phos  3.4     01-20  Mg     1.6     01-20      PTT - ( 21 Jan 2021 00:27 )  PTT:70.9 sec      Serum Pro-Brain Natriuretic Peptide: 5876 pg/mL (01-18 @ 18:08)

## 2021-01-21 NOTE — DISCHARGE NOTE PROVIDER - NSDCMRMEDTOKEN_GEN_ALL_CORE_FT
AMLODIPINE BESYLATE 5 MG TAB: TAKE 1 TABLET BY MOUTH EVERY DAY  ATORVASTATIN 40 MG TABLET: TAKE 1 TABLET BY MOUTH EVERY DAY  Eliquis 5 mg oral tablet: 1 tab(s) orally 2 times a day  FENOFIBRATE 145 MG TABLET: TAKE 1 TABLET BY MOUTH EVERY DAY  METFORMIN HCL  MG TABLET:   METOPROLOL SUCC  MG TAB: TAKE 1 TABLET BY MOUTH EVERY DAY  Neurontin 600 mg oral tablet: 1 tab(s) orally 3 times a day  omeprazole 20 mg oral delayed release capsule: 1 cap(s) orally once a day  PREDNISONE 10 MG TABLET: 4 TABS DAILY X5 DAYS THEN 3 TABS DAILY X5 DAYS THEN 2 TABS DAILY X5 DAYS THEN AS DIRECTED ONCE DAILY

## 2021-01-21 NOTE — PROGRESS NOTE ADULT - ATTENDING COMMENTS
Agree with plan as outlined above.
patient seen and examined. discussed with team above.  afib RVR : s/p PIERRE cardioversion. remain SR overngiht. cont with bblocker. cont with hep gtt.    s/p cardiac cath without significant CAD.   right leg pain: MRI lumbar spine
patient seen and examined. discussed with team above.  afib RVR : s/p PIERRE cardioversion. remain SR . cont with bblocker. switch to eliquis for d/c   s/p cardiac cath without significant CAD.   right leg pain: MRI lumbar spine with disc herniation. outpt follow up with surg  d/c planning home today  d/c time 40 mins
patient seen and examined. discussed with team above.  afib RVR : awaiting PIERRE cardioversion. hep gtt. rate control  right leg pain: MRI lumbar spine

## 2021-01-21 NOTE — PROGRESS NOTE ADULT - SUBJECTIVE AND OBJECTIVE BOX
PROGRESS NOTE:     CONTACT INFO:  Winston Costa MD (Resident Physician - PGY-1 - Internal Medicine)  [email or web address]  Pager: [phone] (any site) or [phone] ([facility] only)    Patient is a 65y old  Male who presents with a chief complaint of SOB (21 Jan 2021 07:56)      SUBJECTIVE / OVERNIGHT EVENTS:  No acute events overnight. Patient seen and evaluated at bedside. No fever/chills.  Denies SOB at rest, chest pain, palpitations, abdominal pain, nausea/vomiting    ADDITIONAL REVIEW OF SYSTEMS:    MEDICATIONS  (STANDING):  apixaban 5 milliGRAM(s) Oral two times a day  atorvastatin 40 milliGRAM(s) Oral at bedtime  dextrose 40% Gel 15 Gram(s) Oral once  dextrose 5%. 1000 milliLiter(s) (50 mL/Hr) IV Continuous <Continuous>  dextrose 5%. 1000 milliLiter(s) (100 mL/Hr) IV Continuous <Continuous>  dextrose 50% Injectable 25 Gram(s) IV Push once  dextrose 50% Injectable 12.5 Gram(s) IV Push once  dextrose 50% Injectable 25 Gram(s) IV Push once  fenofibrate Tablet 145 milliGRAM(s) Oral daily  gabapentin 600 milliGRAM(s) Oral three times a day  glucagon  Injectable 1 milliGRAM(s) IntraMuscular once  insulin lispro (ADMELOG) corrective regimen sliding scale   SubCutaneous three times a day before meals  insulin lispro (ADMELOG) corrective regimen sliding scale   SubCutaneous at bedtime  lidocaine   Patch 2 Patch Transdermal every 24 hours  metoprolol succinate  milliGRAM(s) Oral daily  pantoprazole    Tablet 40 milliGRAM(s) Oral before breakfast  predniSONE   Tablet 10 milliGRAM(s) Oral daily    MEDICATIONS  (PRN):      CAPILLARY BLOOD GLUCOSE      POCT Blood Glucose.: 124 mg/dL (21 Jan 2021 11:51)  POCT Blood Glucose.: 130 mg/dL (21 Jan 2021 07:11)  POCT Blood Glucose.: 196 mg/dL (21 Jan 2021 00:15)  POCT Blood Glucose.: 195 mg/dL (20 Jan 2021 16:14)    I&O's Summary    20 Jan 2021 07:01  -  21 Jan 2021 07:00  --------------------------------------------------------  IN: 515 mL / OUT: 350 mL / NET: 165 mL        PHYSICAL EXAM:  Vital Signs Last 24 Hrs  T(C): 36.9 (21 Jan 2021 11:23), Max: 36.9 (21 Jan 2021 11:23)  T(F): 98.4 (21 Jan 2021 11:23), Max: 98.4 (21 Jan 2021 11:23)  HR: 67 (21 Jan 2021 11:23) (64 - 78)  BP: 149/82 (21 Jan 2021 11:23) (125/67 - 164/84)  BP(mean): --  RR: 18 (21 Jan 2021 11:23) (16 - 18)  SpO2: 96% (21 Jan 2021 11:23) (95% - 98%)    CONSTITUTIONAL: NAD, well-developed  RESPIRATORY: Normal respiratory effort; lungs are clear to auscultation bilaterally  CARDIOVASCULAR: Regular rate and rhythm, normal S1 and S2, no murmur/rub/gallop; No lower extremity edema; Peripheral pulses are 2+ bilaterally  ABDOMEN: Nontender to palpation, normoactive bowel sounds, no rebound/guarding; No hepatosplenomegaly  MUSCLOSKELETAL: no clubbing or cyanosis of digits; no joint swelling or tenderness to palpation  PSYCH: A+O to person, place, and time; affect appropriate    LABS:                        11.2   4.89  )-----------( 228      ( 21 Jan 2021 07:17 )             35.6     01-21    141  |  105  |  22  ----------------------------<  197<H>  3.3<L>   |  22  |  1.15    Ca    8.7      21 Jan 2021 07:15  Phos  2.4     01-21  Mg     1.6     01-21    TPro  5.8<L>  /  Alb  3.1<L>  /  TBili  0.3  /  DBili  x   /  AST  201<H>  /  ALT  506<H>  /  AlkPhos  38<L>  01-21    PTT - ( 21 Jan 2021 07:20 )  PTT:62.2 sec            RADIOLOGY & ADDITIONAL TESTS:  Results Reviewed:   Imaging Personally Reviewed:  Electrocardiogram Personally Reviewed:    COORDINATION OF CARE:  Care Discussed with Consultants/Other Providers [Y/N]:  Prior or Outpatient Records Reviewed [Y/N]:   PROGRESS NOTE:     CONTACT INFO:  Winston Costa MD (Resident Physician - PGY-1 - Internal Medicine)  [email or web address]  Pager: [phone] (any site) or [phone] ([facility] only)    Patient is a 65y old  Male who presents with a chief complaint of SOB (21 Jan 2021 07:56)      SUBJECTIVE / OVERNIGHT EVENTS:  No acute events overnight. Patient seen and evaluated at bedside. No fever/chills.  Denies SOB at rest, chest pain, palpitations, abdominal pain, nausea/vomiting    ADDITIONAL REVIEW OF SYSTEMS:    MEDICATIONS  (STANDING):  apixaban 5 milliGRAM(s) Oral two times a day  atorvastatin 40 milliGRAM(s) Oral at bedtime  dextrose 40% Gel 15 Gram(s) Oral once  dextrose 5%. 1000 milliLiter(s) (50 mL/Hr) IV Continuous <Continuous>  dextrose 5%. 1000 milliLiter(s) (100 mL/Hr) IV Continuous <Continuous>  dextrose 50% Injectable 25 Gram(s) IV Push once  dextrose 50% Injectable 12.5 Gram(s) IV Push once  dextrose 50% Injectable 25 Gram(s) IV Push once  fenofibrate Tablet 145 milliGRAM(s) Oral daily  gabapentin 600 milliGRAM(s) Oral three times a day  glucagon  Injectable 1 milliGRAM(s) IntraMuscular once  insulin lispro (ADMELOG) corrective regimen sliding scale   SubCutaneous three times a day before meals  insulin lispro (ADMELOG) corrective regimen sliding scale   SubCutaneous at bedtime  lidocaine   Patch 2 Patch Transdermal every 24 hours  metoprolol succinate  milliGRAM(s) Oral daily  pantoprazole    Tablet 40 milliGRAM(s) Oral before breakfast  predniSONE   Tablet 10 milliGRAM(s) Oral daily    MEDICATIONS  (PRN):      CAPILLARY BLOOD GLUCOSE      POCT Blood Glucose.: 124 mg/dL (21 Jan 2021 11:51)  POCT Blood Glucose.: 130 mg/dL (21 Jan 2021 07:11)  POCT Blood Glucose.: 196 mg/dL (21 Jan 2021 00:15)  POCT Blood Glucose.: 195 mg/dL (20 Jan 2021 16:14)    I&O's Summary    20 Jan 2021 07:01  -  21 Jan 2021 07:00  --------------------------------------------------------  IN: 515 mL / OUT: 350 mL / NET: 165 mL        PHYSICAL EXAM:  Vital Signs Last 24 Hrs  T(C): 36.9 (21 Jan 2021 11:23), Max: 36.9 (21 Jan 2021 11:23)  T(F): 98.4 (21 Jan 2021 11:23), Max: 98.4 (21 Jan 2021 11:23)  HR: 67 (21 Jan 2021 11:23) (64 - 78)  BP: 149/82 (21 Jan 2021 11:23) (125/67 - 164/84)  BP(mean): --  RR: 18 (21 Jan 2021 11:23) (16 - 18)  SpO2: 96% (21 Jan 2021 11:23) (95% - 98%)    GENERAL: NAD, lying in bed comfortably  HEAD:  Atraumatic, Normocephalic  EYES: EOMI, PERRLA, conjunctiva and sclera clear  ENT: Moist mucous membranes  NECK: Supple, No JVD  CHEST/LUNG: Clear to auscultation bilaterally; No rales, rhonchi, wheezing, or rubs. Unlabored respirations  HEART: RRR; No murmurs, rubs, or gallops  ABDOMEN: Bowel sounds presentx4; Soft, Nontender, Nondistended. No hepatomegaly  EXTREMITIES:  2+ Peripheral Pulses, brisk capillary refill. No clubbing, cyanosis, or edema  NERVOUS SYSTEM:  Alert & Oriented X3, speech clear. RLE has full strength and ROM; but significant subjective pain on posterior calf and thigh  SKIN: No rashes or lesions    LABS:                        11.2   4.89  )-----------( 228      ( 21 Jan 2021 07:17 )             35.6     01-21    141  |  105  |  22  ----------------------------<  197<H>  3.3<L>   |  22  |  1.15    Ca    8.7      21 Jan 2021 07:15  Phos  2.4     01-21  Mg     1.6     01-21    TPro  5.8<L>  /  Alb  3.1<L>  /  TBili  0.3  /  DBili  x   /  AST  201<H>  /  ALT  506<H>  /  AlkPhos  38<L>  01-21    PTT - ( 21 Jan 2021 07:20 )  PTT:62.2 sec            RADIOLOGY & ADDITIONAL TESTS:  Results Reviewed:   Imaging Personally Reviewed:  Electrocardiogram Personally Reviewed:    COORDINATION OF CARE:  Care Discussed with Consultants/Other Providers [Y/N]:  Prior or Outpatient Records Reviewed [Y/N]:

## 2021-01-21 NOTE — DISCHARGE NOTE PROVIDER - NSDCCPCAREPLAN_GEN_ALL_CORE_FT
PRINCIPAL DISCHARGE DIAGNOSIS  Diagnosis: Atrial fibrillation  Assessment and Plan of Treatment: You came to the hospital because you were short of breath. Our tests demonstrated that you were in "atrial fibrillation," a condition where the top chamber of the heart (the atria) loses its synchronicity with the lower chambers (the ventricles). This condition can cause a loss of cardiac efficiency, elevated heart rates, and fluid backup into the lungs and other parts of the body. We initially treated your atrial fibrillation with a beta-blocker to control the rate, but this treatment alone was insufficient to return your heart to normal rhythm. You required cardioversion to return your heart to normal rhythm. Due to the risk of developing blood clots, you were also placed on anticoagulation (Eliquis) to reduce the risk of stroke that is associated with atrial fibrillation.      SECONDARY DISCHARGE DIAGNOSES  Diagnosis: Low back pain radiating to right leg  Assessment and Plan of Treatment:     Diagnosis: Elevated liver enzymes  Assessment and Plan of Treatment:      PRINCIPAL DISCHARGE DIAGNOSIS  Diagnosis: Atrial fibrillation  Assessment and Plan of Treatment: You came to the hospital because you were short of breath. Our tests demonstrated that you were in "atrial fibrillation," a condition where the top chamber of the heart (the atria) loses its synchronicity with the lower chambers (the ventricles). This condition can cause a loss of cardiac efficiency, elevated heart rates, and fluid backup into the lungs and other parts of the body. We initially treated your atrial fibrillation with a beta-blocker to control the rate, but this treatment alone was insufficient to return your heart to normal rhythm. You required cardioversion to return your heart to normal rhythm. Due to the risk of developing blood clots, you were also placed on anticoagulation (Eliquis) to reduce the risk of stroke that is associated with atrial fibrillation. Please follow up with your cardiologist for continued management and discuss this episode of atrial fibrillation and the addition of eliquis to your medication regimen.  PLEASE RETURN TO THE EMERGENCY DEPARTMENT IF you have signs or symptoms of recurring atrial fibrillation including, but not limited to: shortness of breath, chest pain/pressure, palpitations, significantly elevated heart rate, sudden loss of consciousness, altered mental status, or any other symptom you feel needs immediate evaluation by a medical professional.      SECONDARY DISCHARGE DIAGNOSES  Diagnosis: Elevated liver enzymes  Assessment and Plan of Treatment: You came to the hospital with elevation of your liver enzymes on your initial blood work. You indicated that you were previously aware of elevations of these tests due to discussions with your outpatient doctor and ongoing therapy for your back pain. Although your liver enzymes did continue to rise on our inpatient blood work, you did not demonstrate any symptoms (abdominal pain, nausea/vomiting, diarrhea, jaundice, icterus, pruritis, anorexia, fever) that would be concerning for acute liver pathology. Given previous known elevated liver tests, current steroid regimen, absence of acute concerns for clinically significant liver injury (no bilirubin elevation or coagulopathy), and patient preference to be discharged with outpatient follow-up, you will be discharged despite your elevated liver function tests with explicit instructions to follow-up with your primary care provider within 1-week of discharge.  RETURN TO THE EMERGENCY DEPARTMENT IMMEDIATELY IF: you have signs or symptoms of liver damage or liver failure including, but not limited to: yellowing of the skin, nausea, vomiting, pale stools, clotting difficulties, shortness of breath, abdominal pain or distension, edema of the legs, chest pain, chest pressure, or any other sign/symptom you feel warrants immediate evaluation by a medical professional.    Diagnosis: Low back pain radiating to right leg  Assessment and Plan of Treatment: You came to the hospital with severe pain in the right lower leg that is exacerbated by movements. The described symptoms are consistent with neuropathic pain due to disk protrusion or herniation. We performed an MRI of your spine to evaluate your pain and found several abnormal areas in the lumbar/sacral area that could be causing your issues. A copy of your MRI report was given to you at your request. A copy of the report is also available in the procedures section.  PLEASE SEEK IMMEDIATE MEDICAL ATTENTION IF: You develop signs/symptoms of spinal cord compression or red-flag symptoms of back pain including, but not limited to: severe pain, paralysis of the legs, significant leg weakness, loss of sensation on the inner things/buttocks, urinary or fecal incontinence, fevers, rash, or any other sign/symptom you feel warrants immediate evaluation by a medical professional.

## 2021-01-21 NOTE — PROGRESS NOTE ADULT - PROBLEM SELECTOR PLAN 6
Patient possibly has disc herniation  MR thoracolumbar spine ordered for w/u  PT eval  Patient was prescribed a prednisone taper outpatient, now tapering down to 10mg qday this week  Lidocane patches  Gabapentin 600mg qhs

## 2021-01-21 NOTE — DISCHARGE NOTE PROVIDER - NSDCFUADDINST_GEN_ALL_CORE_FT
Please follow-up with your [place] Cardiologist within 2-weeks of discharge from the hospital. [facility] recommends a follow up echocardiogram in ~3months to assess your cardiac function. Please contact the [facility] department of medical records if you require additional information regarding your cardiac catheterization or MRI.

## 2021-01-21 NOTE — PROGRESS NOTE ADULT - PROBLEM SELECTOR PLAN 10
Stable and controlled on Lotensin, HCTZ, Lopressor, and verapamil  Continue medication as prescribed  Continue current treatment plan as previously prescribed with your PCP and cardiologist, Dr. Swanson    
1. Name of PCP:  2. PCP contacted on admission: [  ] Y   [  ] N  3. PCP contacted at Discharge: [  ] Y   [  ] N  4. Post-Discharge Appointment Date and Location:   5 Summary of Handoff given to PCP:

## 2021-01-21 NOTE — DISCHARGE NOTE NURSING/CASE MANAGEMENT/SOCIAL WORK - PATIENT PORTAL LINK FT
You can access the FollowMyHealth Patient Portal offered by [facility] by registering at the following website: [email or web address]. By joining [employer]’s FollowMyHealth portal, you will also be able to view your health information using other applications (apps) compatible with our system.

## 2021-01-21 NOTE — PROGRESS NOTE ADULT - ASSESSMENT
64 yo M hx of HTN, HLD, T2DM, presented with SOB, found to have Afib with RVR, SUSAN, transaminitis, likely mild pulm edema, and chronic right leg pain. S/P PIERRE cardioversion and return to NSR. Awaiting MRI of spine. 66 yo M hx of HTN, HLD, T2DM, presented with SOB, found to have Afib with RVR, SUSAN, transaminitis, likely mild pulm edema, and chronic right leg pain. S/P PIERRE cardioversion and return to NSR. S/P MRI.     LFTs rising; pt indicating previous knowledge of elevated LFTs as outpatient. No current signs/sxs of liver pathology. Pt asking to be discharged despite elevated LFTs given absence of clinically significant findings.

## 2021-01-21 NOTE — PROGRESS NOTE ADULT - ASSESSMENT
65M HTN, HLD, DM2 c/o dyspnea and presyncope found to have new AF w/ RVR.    #New AF w/ RVR  -S/p PIERRE/DCCV 1/19  -C/w tele monitoring; in NSR now  -Maintain lytes WNL  -TFTs WNL  -Transition to DOAC (based on insurance apixaban vs rivaroxaban)  -C/w metop 100mg daily    #Mild LV/RV dysfxn  -Suspect 2/2 rapid AF (tachycardia induced cardiomyopathy)  -Repeat TTE still with depressed LV function  -[facility] yesterday with normal right and left coronary arteries  -Follow up with Dr. Mays (number in 1st consult note) with plan for repeat echo as outpatient     65M HTN, HLD, DM2 c/o dyspnea and presyncope found to have new AF w/ RVR.    #New AF w/ RVR  -S/p PIERRE/DCCV 1/19  -C/w tele monitoring; in NSR now  -Maintain lytes WNL  -TFTs WNL  -Transition to DOAC (based on insurance apixaban vs rivaroxaban)  -C/w metop 100mg daily    #Mild LV/RV dysfxn  -Suspect 2/2 rapid AF (tachycardia induced cardiomyopathy)  -Repeat TTE still with depressed LV function  -[facility] yesterday with normal right and left coronary arteries  -Follow up with Dr. Mays (number in 1st consult note) with plan for repeat echo as outpatient    #No cardiology contraindications to discharge

## 2021-01-21 NOTE — DISCHARGE NOTE PROVIDER - HOSPITAL COURSE
66 yo M hx of HTN, HLD, T2DM, presented with SOB, found to have Afib with RVR, SUSAN, elevated transaminases, and chronic right leg pain. Admitted w/cardizem gtt for rate control, heparin full AC, and placed on telemetry monitoring for spontaneous resolution to NSR. Pt remained in a. fib ON and was subsequently taken for PIERRE cardioversion 1/19. Post-PIERRE cardioversion pt noted to be in NSR, but w/some residual hypokinesis that prompted cardiac cath on 1/20 for ischemic eval. Cardiac cath on 1/20 w/clean cores, but pt continued to have residual hypokinesis; presumed to be tachycardia induced cardiomyopathy. Pt returned to floors for monitoring and remained stable in NSR w/o event or recurring a. fib. Transitioned to Eliquis for score of 3 on [id number]. To continue metoprolol succ as OP for rate control.    Pt also noted to have chronic and severe neuropathic R leg pain. Requested MRI while inpatient as he has been undergoing empiric tx w/steroids and formal w/u w/OP provider. MRI performed and revealing several disk related issues that have structural ability to generate current findings. No cord compression noted. Pt will f/u w/OP spine (established) for further management. To be d/c'ed on gabapentin 600mg TID in meantime.     Pt also elevated SCr and LFTs. SCr elevation likely SUSAN ISO a.fib and low flow 2/2 tachycardia induced cardiomyopathy. Resolved w/IVF and restoration of NSR. Elevated LFTs reported to be present as OP. Pt w/uptrending labs on day of discharge, but denies any symptoms (abdominal pain, nausea/vomiting, diarrhea, jaundice, icterus, pruritis, anorexia, fever). Given previous known elevated LFTs as OP, current steroid regimen, and known low-flow state during admission, pt has no acute concerns for clinically significant liver injury. Lab findings were d/w pt and he indicated that he would   f/u regarding elevated LFTs and was asymptomatic and did not wish to remain hospitalized as the result of a lab finding given the absence of clinically significant signs/symptoms.     You came to the hospital because you were short of breath. Our tests demonstrated that you were in "atrial fibrillation," a condition where the top chamber of the heart (the atria) loses its synchronicity with the lower chambers (the ventricles). This condition can cause a loss of cardiac efficiency, elevated heart rates, and fluid backup into the lungs and other parts of the body. We initially treated your atrial fibrillation with a beta-blocker to control the rate, but this treatment alone was insufficient to return your heart to normal rhythm. You required cardioversion to return your heart to normal rhythm. Due to the risk of developing blood clots, you were also placed on anticoagulation (Eliquis) to reduce the risk of stroke that is associated with atrial fibrillation.           64 yo M hx of HTN, HLD, T2DM, presented with SOB, found to have Afib with RVR, SUSAN, elevated transaminases, and chronic right leg pain. Admitted w/cardizem gtt for rate control, heparin full AC, and placed on telemetry monitoring for spontaneous resolution to NSR. Pt remained in a. fib ON and was subsequently taken for PIERRE cardioversion 1/19. Post-PIERRE cardioversion pt noted to be in NSR, but w/some residual hypokinesis that prompted cardiac cath on 1/20 for ischemic eval. Cardiac cath on 1/20 w/clean cores, but pt continued to have residual hypokinesis; presumed to be tachycardia induced cardiomyopathy. Pt returned to floors for monitoring and remained stable in NSR w/o event or recurring a. fib. Transitioned to Eliquis for score of 3 on [id number]. To continue metoprolol succ as OP for rate control.    Pt also noted to have chronic and severe neuropathic R leg pain. Requested MRI while inpatient as he has been undergoing empiric tx w/steroids and formal w/u w/OP provider. MRI performed and revealing several disk related issues that have structural ability to generate current findings. No cord compression noted. Pt will f/u w/OP spine (established) for further management. To be d/c'ed on gabapentin 600mg TID in meantime.     Pt also elevated SCr and LFTs. SCr elevation likely SUSAN ISO a.fib and low flow 2/2 tachycardia induced cardiomyopathy. Resolved w/IVF and restoration of NSR. Elevated LFTs reported to be present as OP. Pt w/uptrending labs on day of discharge, but denies any symptoms (abdominal pain, nausea/vomiting, diarrhea, jaundice, icterus, pruritis, anorexia, fever). Given previous known elevated LFTs as OP, current steroid regimen, and known low-flow state during admission, pt has no acute concerns for clinically significant liver injury. Lab findings were d/w pt and he indicated that he would   f/u regarding elevated LFTs and was asymptomatic and did not wish to remain hospitalized as the result of a lab finding given the absence of clinically significant signs/symptoms.

## 2023-09-16 ENCOUNTER — EMERGENCY (EMERGENCY)
Facility: HOSPITAL | Age: 68
LOS: 1 days | Discharge: HOME | End: 2023-09-16
Attending: EMERGENCY MEDICINE
Payer: COMMERCIAL

## 2023-09-16 VITALS
HEIGHT: 69 IN | DIASTOLIC BLOOD PRESSURE: 98 MMHG | HEART RATE: 84 BPM | SYSTOLIC BLOOD PRESSURE: 191 MMHG | TEMPERATURE: 100 F | OXYGEN SATURATION: 97 % | RESPIRATION RATE: 20 BRPM | WEIGHT: 164.91 LBS

## 2023-09-16 VITALS
RESPIRATION RATE: 17 BRPM | DIASTOLIC BLOOD PRESSURE: 91 MMHG | TEMPERATURE: 99 F | OXYGEN SATURATION: 99 % | SYSTOLIC BLOOD PRESSURE: 165 MMHG | HEART RATE: 76 BPM

## 2023-09-16 DIAGNOSIS — Z98.1 ARTHRODESIS STATUS: Chronic | ICD-10-CM

## 2023-09-16 PROCEDURE — 99284 EMERGENCY DEPT VISIT MOD MDM: CPT

## 2023-09-16 PROCEDURE — 99283 EMERGENCY DEPT VISIT LOW MDM: CPT

## 2023-09-16 RX ORDER — ACETAMINOPHEN 500 MG
975 TABLET ORAL ONCE
Refills: 0 | Status: COMPLETED | OUTPATIENT
Start: 2023-09-16 | End: 2023-09-16

## 2023-09-16 RX ORDER — CYCLOBENZAPRINE HYDROCHLORIDE 10 MG/1
1 TABLET, FILM COATED ORAL
Qty: 30 | Refills: 0
Start: 2023-09-16 | End: 2023-09-25

## 2023-09-16 RX ORDER — CYCLOBENZAPRINE HYDROCHLORIDE 10 MG/1
10 TABLET, FILM COATED ORAL ONCE
Refills: 0 | Status: COMPLETED | OUTPATIENT
Start: 2023-09-16 | End: 2023-09-16

## 2023-09-16 RX ORDER — OXYCODONE HYDROCHLORIDE 5 MG/1
5 TABLET ORAL ONCE
Refills: 0 | Status: DISCONTINUED | OUTPATIENT
Start: 2023-09-16 | End: 2023-09-16

## 2023-09-16 RX ORDER — CYCLOBENZAPRINE HYDROCHLORIDE 10 MG/1
1 TABLET, FILM COATED ORAL
Qty: 12 | Refills: 0
Start: 2023-09-16

## 2023-09-16 RX ADMIN — CYCLOBENZAPRINE HYDROCHLORIDE 10 MILLIGRAM(S): 10 TABLET, FILM COATED ORAL at 21:01

## 2023-09-16 RX ADMIN — Medication 975 MILLIGRAM(S): at 21:01

## 2023-09-16 NOTE — ED PROVIDER NOTE - CARE PROVIDER_API CALL
Ruperto Wyatt  Orthopaedic Surgery  [address], [address]  [place] [address]  Phone: [phone]  Fax: [phone]  Follow Up Time:     Rajesh Walker  Orthopaedic Surgery  [address], [address]  [place] [address]  Phone: [phone]  Fax: [phone]  Follow Up Time:

## 2023-09-16 NOTE — ED PROVIDER NOTE - ATTENDING APP SHARED VISIT CONTRIBUTION OF CARE
Attending MD Roberto:  I personally have seen and examined this patient.  I have performed a substantive portion of the visit including all aspects of the medical decision making.   NP note reviewed and agree on plan of care and except where noted.             *The above represents an initial assessment/impression. Please refer to progress notes for potential changes in patient clinical course*

## 2023-09-16 NOTE — ED ADULT NURSE NOTE - OBJECTIVE STATEMENT
69 y/o M with PMH of HTN, HLD, T2DM, A/Fib on Xarelto presents to ED complaining of lower leg injury. Pt reports playing pickle ball 1.5 hours ago and had a sudden on set of left calf pain and swelling. Pt has full sensation and ROM in lower extremity. Denies LOC, fall or head injury, other injuries. Denies headache, dizziness, vision changes, chest pain, shortness of breath, abdominal pain, nausea, vomiting, diarrhea, fevers, chills, dysuria, hematuria, recent illness travel

## 2023-09-16 NOTE — ED PROVIDER NOTE - NSFOLLOWUPINSTRUCTIONS_ED_ALL_ED_FT
Your are seen in ED for left calf muscle strain or partial tear and please see the information of muscle strain.    Rest.    Elevate the affected leg.    Ice to pain area; every 2hours for 20minutes.    Use crutches as instructed.    Keep continue your current medications as prescribed.    Take Tylenol (2 tablets of 500mg every 8hours) as needed for pain.    Follow up with sports medicine Aaron Cortes, or [facility] ([phone]), call Monday for appointment.    Return for any concerns, fever, numbness, weakness, difficult walking, or worsening pain. Your are seen in ED for left calf muscle strain or partial tear and please see the information of muscle strain.    Rest.    Elevate the affected leg.    Ice to pain area; every 2hours for 20minutes.    Use crutches as instructed.    Keep continue your current medications as prescribed.    Take Tylenol (2 tablets of 500mg every 8hours) as needed for pain.    Flexeril as prescribed for muscle spasm with a caution of drowsiness.     Follow up with sports medicine Aaron Cortes, or [facility] ([phone]), call Monday for appointment.    Return for any concerns, fever, numbness, weakness, difficult walking, or worsening pain. Your are seen in ED for left calf muscle strain or partial tear and please see the information of muscle strain.    Rest.    Elevate the affected leg.    Ice to pain area; every 2hours for 20minutes.    Use crutches or the walker as instructed.    Keep continue your current medications as prescribed.    Take Tylenol (2 tablets of 500mg every 8hours) as needed for pain.    Flexeril as prescribed for muscle spasm with a caution of drowsiness.     Follow up with sports medicine Aaron Cortes, or [facility] ([phone]), call Monday for appointment.    Return for any concerns, fever, numbness, weakness, difficult walking, or worsening pain.

## 2023-09-16 NOTE — ED PROVIDER NOTE - NSFOLLOWUPCLINICS_GEN_ALL_ED_FT
[facility]  Sports Medicine  [address] [place] [address]  Phone: [phone]  Fax:

## 2023-09-16 NOTE — ED PROVIDER NOTE - NS ED ATTENDING STATEMENT MOD
This was a shared visit with the [facility]. I reviewed and verified the documentation and independently performed the documented:

## 2023-09-16 NOTE — ED PROVIDER NOTE - OBJECTIVE STATEMENT
69yo male pt with PMHx of HTN, HLD, T2DM, A/Fib on Xarelto presents to ED with left calf pain and swelling s/p injury 1.5hours ago. Reports he felt a sharp pain to left calf while playing pickle ball today. Denies LOC, fall or head injury, other injuries. Denies sensory changes or weakness to extremities. Denies fever, chills, or recent sickness.

## 2023-09-16 NOTE — ED PROVIDER NOTE - PATIENT PORTAL LINK FT
You can access the FollowMyHealth Patient Portal offered by [facility] by registering at the following website: [email or web address]. By joining [employer]’s FollowMyHealth portal, you will also be able to view your health information using other applications (apps) compatible with our system.

## 2023-09-16 NOTE — ED PROVIDER NOTE - CLINICAL SUMMARY MEDICAL DECISION MAKING FREE TEXT BOX
Attending MD Roberto:  68-year-old gentleman with history of A-fib on Xarelto presenting for evaluation of severe left posterior calf pain.  Patient was playing pickle ball and felt a pop in his left calf.  He has no numbness or tingling distally.  Has not taken any pain medications prior to arrival.    Examination of left lower extremity reveals palpable DP pulse left foot.  The left calf compartment is soft to the touch, the left thigh compartment is soft to the touch.  Sensation intact grossly to light touch throughout left lower extremity.  Patient wiggle all toes of the foot easily.  There is tenderness of the left posterior calf.  Normal Kim's test of the left Achilles tendon.  No palpable defect in the left Achilles tendon.    Patient presenting with left posterior calf pain after he was playing pickle ball.  Clinically no evidence of Achilles rupture.  Exam is consistent with likely calf strain/tear.  There is no evidence of compartment syndrome at this time.  Patient educated on the signs or symptoms of compartment syndrome.  Plan to apply a compressive dressing elevate ice analgesia and referred to orthopedics for further evaluation.      *The above represents an initial assessment/impression. Please refer to progress notes for potential changes in patient clinical course*

## 2023-09-16 NOTE — ED PROVIDER NOTE - PHYSICAL EXAMINATION
NAD. Neck supple. Lungs clear. No spinal tender. NO chest wall, rib or cva tender. ABD soft, non tender. No pelvic or hip tender. +Left calf tender/swelling without ecchymosis. No ankle or achilles tender or swelling with full ROMs. N/V- intact.

## 2023-09-16 NOTE — ED PROVIDER NOTE - CARE PROVIDERS DIRECT ADDRESSES
[email or web address]

## 2023-09-16 NOTE — ED ADULT NURSE NOTE - NSFALLRISKINTERV_ED_ALL_ED
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
Detail Level: Detailed
Detail Level: Simple
Comment: Including lesion of concern on L shoulder
Render Risk Assessment In Note?: no
Assistance OOB with selected safe patient handling equipment if applicable/Assistance with ambulation/Communicate fall risk and risk factors to all staff, patient, and family/Monitor gait and stability/Provide visual cue: yellow wristband, yellow gown, etc/Reinforce activity limits and safety measures with patient and family/Call bell, personal items and telephone in reach/Instruct patient to call for assistance before getting out of bed/chair/stretcher/Non-slip footwear applied when patient is off stretcher/[place] to call system/Physically safe environment - no spills, clutter or unnecessary equipment/Purposeful Proactive Rounding/Room/bathroom lighting operational, light cord in reach

## 2023-09-17 PROBLEM — E78.5 HYPERLIPIDEMIA, UNSPECIFIED: Chronic | Status: ACTIVE | Noted: 2021-01-18

## 2023-09-17 PROBLEM — E11.9 TYPE 2 DIABETES MELLITUS WITHOUT COMPLICATIONS: Chronic | Status: ACTIVE | Noted: 2021-01-18

## 2023-09-17 PROBLEM — I10 ESSENTIAL (PRIMARY) HYPERTENSION: Chronic | Status: ACTIVE | Noted: 2021-01-18

## 2023-09-26 NOTE — PACU DISCHARGE NOTE - PAIN:
Past Medical History:   Diagnosis Date    Diabetes mellitus     Hypertension     Stroke     Urinary retention        Past Surgical History:   Procedure Laterality Date    carotid surgery      CORONARY ARTERY BYPASS GRAFT         Review of patient's allergies indicates:   Allergen Reactions    Clopidogrel      Causes diarrhea     Penicillin v Hives    Penicillins     Sulfamethoxazole-trimethoprim Hives       No current facility-administered medications on file prior to encounter.     Current Outpatient Medications on File Prior to Encounter   Medication Sig    aspirin (ECOTRIN) 81 MG EC tablet Take 81 mg by mouth once daily.    benazepril (LOTENSIN) 5 MG tablet Take 5 mg by mouth once daily. 1/2 am and 1/2 pm    clobetasoL (TEMOVATE) 0.05 % cream 1 application  2 (two) times daily.    clonazePAM (KLONOPIN) 0.5 MG tablet Take 0.5 mg by mouth every evening.    dipyridamole-aspirin 200-25 mg (AGGRENOX)  mg CM12 Take 1 capsule by mouth 2 (two) times daily.    finasteride (PROSCAR) 5 mg tablet Take 1 tablet by mouth once daily.    latanoprost 0.005 % ophthalmic solution 1 drop every evening.    metformin (GLUCOPHAGE) 500 MG tablet Take 1,000 mg by mouth 2 (two) times daily with meals.    metoprolol succinate (TOPROL-XL) 25 MG 24 hr tablet Take 12.5 mg by mouth once daily.    simvastatin (ZOCOR) 40 MG tablet Take 1 tablet by mouth every evening.    tamsulosin (FLOMAX) 0.4 mg Cap Take 1 capsule by mouth.    [DISCONTINUED] SIMVASTATIN ORAL Take 1 tablet by mouth once daily.    aspirin (ECOTRIN) 325 MG EC tablet Take by mouth once daily. 0.5 tab    doxycycline (MONODOX) 100 MG capsule Take 100 mg by mouth 2 (two) times daily.     Family History    None       Tobacco Use    Smoking status: Never    Smokeless tobacco: Current   Substance and Sexual Activity    Alcohol use: Yes     Comment: occasional    Drug use: No    Sexual activity: Not on file     Review of Systems   Eyes:  Negative for photophobia and visual  disturbance.   Musculoskeletal:  Positive for gait problem. Negative for arthralgias, myalgias, neck pain and neck stiffness.   Neurological:  Positive for weakness and numbness. Negative for dizziness, tremors, syncope, facial asymmetry, speech difficulty, light-headedness and headaches.   All other systems reviewed and are negative.    Objective:     Vital Signs (Most Recent):  Temp: 97.6 °F (36.4 °C) (09/26/23 0008)  Pulse: 62 (09/26/23 0008)  Resp: 18 (09/26/23 0008)  BP: (!) 129/57 (09/26/23 0008)  SpO2: 95 % (09/26/23 0008) Vital Signs (24h Range):  Temp:  [97.6 °F (36.4 °C)-98.4 °F (36.9 °C)] 97.6 °F (36.4 °C)  Pulse:  [62-97] 62  Resp:  [18-50] 18  SpO2:  [91 %-100 %] 95 %  BP: (119-166)/() 129/57     Weight: 75.1 kg (165 lb 9.1 oz)  Body mass index is 23.76 kg/m².     Physical Exam  Vitals and nursing note reviewed.   Constitutional:       General: He is awake. He is not in acute distress.     Appearance: Normal appearance. He is well-developed and well-groomed. He is not ill-appearing, toxic-appearing or diaphoretic.   HENT:      Head: Normocephalic and atraumatic.      Mouth/Throat:      Mouth: Mucous membranes are moist.      Pharynx: Oropharynx is clear.   Eyes:      Extraocular Movements: Extraocular movements intact.      Conjunctiva/sclera: Conjunctivae normal.      Pupils: Pupils are equal, round, and reactive to light.      Visual Fields: Right eye visual fields normal and left eye visual fields normal.   Cardiovascular:      Rate and Rhythm: Normal rate and regular rhythm.      Pulses: Normal pulses.      Heart sounds: Normal heart sounds. No murmur heard.  Pulmonary:      Effort: Pulmonary effort is normal.      Breath sounds: Normal breath sounds.   Abdominal:      General: Bowel sounds are normal.      Palpations: Abdomen is soft.      Tenderness: There is no abdominal tenderness.   Musculoskeletal:      Cervical back: Normal range of motion and neck supple.      Comments: 5/5 strength  throughout, with the exception to 4/5 strength noted in right upper extremity.   Skin:     General: Skin is warm and dry.      Capillary Refill: Capillary refill takes less than 2 seconds.   Neurological:      General: No focal deficit present.      Mental Status: He is alert and oriented to person, place, and time. Mental status is at baseline.      GCS: GCS eye subscore is 4. GCS verbal subscore is 5. GCS motor subscore is 6.      Cranial Nerves: Cranial nerves 2-12 are intact.      Sensory: Sensation is intact.      Motor: Motor function is intact.      Coordination: Coordination is intact.      Comments: DTRs are equal, present, but diminished throughout.  Able to perform finger-to-nose and heel-to-shin test without abnormalities noted.  Able to differentiate between pinprick and soft touch throughout.   Psychiatric:         Mood and Affect: Mood normal.         Behavior: Behavior normal. Behavior is cooperative.              CRANIAL NERVES     CN III, IV, VI   Pupils are equal, round, and reactive to light.  LABS:  Recent Results (from the past 24 hour(s))   CBC W/ AUTO DIFFERENTIAL    Collection Time: 09/25/23  2:00 PM   Result Value Ref Range    WBC 5.94 3.90 - 12.70 K/uL    RBC 4.14 (L) 4.60 - 6.20 M/uL    Hemoglobin 12.9 (L) 14.0 - 18.0 g/dL    Hematocrit 38.9 (L) 40.0 - 54.0 %    MCV 94 82 - 98 fL    MCH 31.2 (H) 27.0 - 31.0 pg    MCHC 33.2 32.0 - 36.0 g/dL    RDW 13.5 11.5 - 14.5 %    Platelets 164 150 - 450 K/uL    MPV 9.1 (L) 9.2 - 12.9 fL    Immature Granulocytes 0.3 0.0 - 0.5 %    Gran # (ANC) 3.7 1.8 - 7.7 K/uL    Immature Grans (Abs) 0.02 0.00 - 0.04 K/uL    Lymph # 1.6 1.0 - 4.8 K/uL    Mono # 0.5 0.3 - 1.0 K/uL    Eos # 0.1 0.0 - 0.5 K/uL    Baso # 0.02 0.00 - 0.20 K/uL    nRBC 0 0 /100 WBC    Gran % 62.2 38.0 - 73.0 %    Lymph % 27.3 18.0 - 48.0 %    Mono % 8.2 4.0 - 15.0 %    Eosinophil % 1.7 0.0 - 8.0 %    Basophil % 0.3 0.0 - 1.9 %    Differential Method Automated    Comprehensive metabolic  panel    Collection Time: 09/25/23  2:00 PM   Result Value Ref Range    Sodium 139 136 - 145 mmol/L    Potassium 4.4 3.5 - 5.1 mmol/L    Chloride 102 95 - 110 mmol/L    CO2 25 23 - 29 mmol/L    Glucose 112 (H) 70 - 110 mg/dL    BUN 22 8 - 23 mg/dL    Creatinine 0.9 0.5 - 1.4 mg/dL    Calcium 9.9 8.7 - 10.5 mg/dL    Total Protein 7.1 6.0 - 8.4 g/dL    Albumin 4.3 3.5 - 5.2 g/dL    Total Bilirubin 0.4 0.1 - 1.0 mg/dL    Alkaline Phosphatase 63 55 - 135 U/L    AST 14 10 - 40 U/L    ALT 16 10 - 44 U/L    eGFR >60.0 >60 mL/min/1.73 m^2    Anion Gap 12 8 - 16 mmol/L   Protime-INR    Collection Time: 09/25/23  2:00 PM   Result Value Ref Range    Prothrombin Time 11.0 9.0 - 12.5 sec    INR 1.0 0.8 - 1.2   TSH    Collection Time: 09/25/23  2:00 PM   Result Value Ref Range    TSH 1.219 0.400 - 4.000 uIU/mL   LDL - Lipid Panel    Collection Time: 09/25/23  2:00 PM   Result Value Ref Range    Cholesterol 118 (L) 120 - 199 mg/dL    Triglycerides 150 30 - 150 mg/dL    HDL 39 (L) 40 - 75 mg/dL    LDL Cholesterol 49.0 (L) 63.0 - 159.0 mg/dL    HDL/Cholesterol Ratio 33.1 20.0 - 50.0 %    Total Cholesterol/HDL Ratio 3.0 2.0 - 5.0    Non-HDL Cholesterol 79 mg/dL   Troponin I    Collection Time: 09/25/23  2:00 PM   Result Value Ref Range    Troponin I <0.006 0.000 - 0.026 ng/mL   B-Type natriuretic peptide    Collection Time: 09/25/23  2:00 PM   Result Value Ref Range    BNP 27 0 - 99 pg/mL   Urinalysis, Reflex to Urine Culture Urine, Clean Catch    Collection Time: 09/25/23 11:41 PM    Specimen: Urine   Result Value Ref Range    Specimen UA Urine, Clean Catch     Color, UA Yellow Yellow, Straw, Luann    Appearance, UA Clear Clear    pH, UA 7.0 5.0 - 8.0    Specific Gravity, UA 1.030 1.005 - 1.030    Protein, UA Negative Negative    Glucose, UA Negative Negative    Ketones, UA Negative Negative    Bilirubin (UA) Negative Negative    Occult Blood UA Negative Negative    Nitrite, UA Negative Negative    Urobilinogen, UA Negative <2.0  EU/dL    Leukocytes, UA 2+ (A) Negative   Urinalysis Microscopic    Collection Time: 09/25/23 11:41 PM   Result Value Ref Range    RBC, UA 0 0 - 4 /hpf    WBC, UA 10 (H) 0 - 5 /hpf    Hyaline Casts, UA 1 0-1/lpf /lpf    Microscopic Comment SEE COMMENT        RADIOLOGY  CTA Head and Neck (xpd)    Result Date: 9/25/2023  EXAMINATION: CTA HEAD AND NECK (XPD) CLINICAL HISTORY: Stroke/TIA, determine embolic source; TECHNIQUE: Standard contrast enhanced CTA of neck and brain with 100 mL Omnipaque 350 contrast with 3D MIP reformations. COMPARISON: CT and MRI 09/25/2023. FINDINGS: CTA neck: There is extensive plaque of the aortic arch.  Standard great vessel anatomy is noted.  The right brachiocephalic artery reveals noncalcified and calcified plaque. The right common carotid bifurcation reveals postoperative changes suggestive of endarterectomy.  No residual or recurrent stenosis.  There is calcified plaque of the distal right ICA subjacent to the skull base. The left common carotid artery is patent.  The left bifurcation reveals extensive predominately noncalcified plaque.  The residual internal carotid artery lumen is 2 mm.  The distal lumen is 4.6 mm.  Which is consistent with a 60% stenosis.  The left cervical ICA is patent.  There is calcified plaque distally at the skull base. The right vertebral artery is dominant.  The left vertebral artery is small and originates directly from the aortic arch. There is advanced cervical degenerative disc disease with C4-5, C5-6 and C6-7 spinal stenosis. CTA brain: There is calcified plaque of the right cavernous ICA.  The supraclinoid ICA is patent with a prominent posterior communicating artery.  The right ROBERT and branches appear normal.  The right MCA and branches appear normal. The left skull base ICA reveals moderate calcified plaque.  Small posterior communicating artery is present.  The ROBERT is patent.  The left middle cerebral artery and branches appear normal. In the  Controlled with current regime posterior circulation the right vertebral artery is dominant.  The basilar artery is patent.  The posterior cerebral arteries are patent with the right orbit predominantly originating from the posterior communicating artery. NASCET criteria are used for carotid artery stenosis measurements.     Status post right carotid endarterectomy. Left common carotid bifurcation atherosclerosis with predominately noncalcified plaque with 60% left internal carotid artery origin stenosis. Bilateral skull base ICA calcified plaque.  No intracranial vascular occlusion or stenosis. Extensive atherosclerotic plaque of the aortic arch. Advanced cervical degenerative disc disease with spinal canal stenosis at C4-5, C5-6 and C6-7. All CT scans at this facility use dose modulation, iterative reconstruction and/or weight based dosing when appropriate to reduce radiation dose to as low as reasonably achievable. Electronically signed by: Yuriy Frazier MD Date:    09/25/2023 Time:    15:15    MRI Brain Without Contrast    Result Date: 9/25/2023  EXAMINATION: MRI BRAIN WITHOUT CONTRAST CLINICAL HISTORY: Neuro deficit, acute, stroke suspected; TECHNIQUE: Standard multiplanar noncontrast sequences of the brain. COMPARISON: CT brain 09/25/2023. FINDINGS: The ventricles are mildly to moderately enlarged consistent with volume loss.  Mild white matter hyperintensity is present consistent with small vessel ischemic degeneration, greater in extent on the left.  In addition there are small chronic left periventricular white matter lacunar infarcts. There are several small areas of periventricular and subcortical white matter as well as cortical gray matter restricted diffusion of the left hemisphere involving the frontal and parietal lobes near the vertex.  Findings are consistent with several small acute infarcts.  Possible watershed type infarct core small embolic type infarcts. No mass effect. No hemorrhagic transformation. Intracranial  vessels reveal a normal flow void. Pituitary gland region appears normal. No extra-axial fluid collection is seen.     Multiple small acute infarcts involving the white matter and cortical gray matter of the left frontal and parietal lobes due to the vertex.  No mass effect or hemorrhagic transformation. Mild atrophy with white matter degeneration.  Small chronic left periventricular white matter infarcts. Electronically signed by: Yuriy Frazier MD Date:    09/25/2023 Time:    15:03    CT Head Without Contrast    Result Date: 9/25/2023  EXAMINATION: CT HEAD WITHOUT CONTRAST CLINICAL HISTORY: Neuro deficit, acute, stroke suspected; Neurological deficit. TECHNIQUE: Standard noncontrast CT of the brain. All CT scans at this facility are performed  using dose modulation techniques as appropriate to performed exam including the following:  automated exposure control; adjustment of mA and/or kV according to the patients size (this includes techniques or standardized protocols for targeted exams where dose is matched to indication/reason for exam: i.e. extremities or head);  iterative reconstruction technique. COMPARISON: None FINDINGS: Brain: The ventricles are moderately enlarged consistent with volume loss.  No acute edema, hemorrhage or mass effect is present. Skull: The skullbase is intact.  Small bony protuberance is seen arising from the outer table of the right frontal bone.  A similar description was present in a report from 2016.     Age-related atrophy.  No acute findings. Electronically signed by: Yuriy Frazier MD Date:    09/25/2023 Time:    13:44    USV Arterial Waveforms Ankle/Arm Physio, Limited    Result Date: 9/5/2023  Right: Ankle/brachial indices are normal at rest. Pulse volume recordings demonstrate a sharp upstroke and dicrotic notch.   Left: Ankle/brachial indices are normal at rest. Pulse volume recordings demonstrate a sharp upstroke and dicrotic notch.   Conclusions: No evidence of  bilateral lower extremity arterial insufficiency at rest. Recommendations: Follow clinically. Critical Findings: Told results to Dr. Fitzgerald at 5:38 pm.     MRI Lumbar Spine W WO Cont    Result Date: 9/4/2023  MRI LUMBAR SPINE W WO CONTRAST CLINICAL INDICATION: Myelopathy,  acute,  lumbar spine COMPARISON: Correlation with CT 9/3/2023 TECHNIQUE: Multiplanar multisequence magnetic resonance imaging of the lumbar spine was performed including post gadolinium sequences. FINDINGS: Lumbar vertebral body height and alignment are preserved. There is no abnormal marrow or cord signal. The conus medullaris terminates at T12-L1. There are no foci of abnormal enhancement. Individual intervertebral levels will be described below: L1/2: Normal. L2/3: Mild broad-based posterior disc bulge. Mild bilateral neuroforaminal stenosis. L3/4: Tiny posterior annular fissure. Broad-based posterior disc bulge, bilateral ligamentum flavum hypertrophy and facet osteoarthritis. Mild bilateral neuroforaminal stenosis. L4/5: Posterior annular fissure. Broad-based posterior disc bulge, bilateral ligamentum flavum hypertrophy and facet osteoarthritis. Moderate central canal stenosis, narrowing of the left greater than right lateral recesses. Severe right and moderate left neuroforaminal stenosis. L5/S1: Broad-based posterior disc bulge, posterior annular fissure. Bilateral facet osteoarthritis. Mild right and moderate left neuroforaminal stenosis.    Multilevel degenerative changes as above    CT Lumbar Spine Without Contrast    Result Date: 9/3/2023  CT LUMBAR SPINE WO CONTRAST INDICATION:  pain intermittent lower extremity weakness COMPARISON: none TECHNIQUE: Automated exposure control was utilized.  CT of the lumbar spine was performed without IV contrast. DISCUSSION: Alignment is satisfactory. The vertebral body heights are well-preserved. There is at least mild spinal canal stenosis at L3/L4 and L4/L5 related to degenerative disc disease and  facet osteoarthritis. There is an aortobiiliac stent graft. Incidental note is made of cholelithiasis.    Degenerative change without acute osseous abnormality.    X-Ray Chest AP Portable    Result Date: 9/3/2023  EXAMINATION: XR CHEST AP PORTABLE CLINICAL HISTORY: Coronary artery disease., Generalized weakness; COMPARISON: 09/20/2022 x-ray. FINDINGS: Sternal wires and mediastinal clips are again noted. The heart size is nonenlarged Small left suprahilar granuloma again noted. No acute infiltrate.     No acute findings. Electronically signed by: Yuriy Frazier MD Date:    09/03/2023 Time:    10:04      EKG    MICROBIOLOGY    MDM     Amount and/or Complexity of Data Reviewed  Clinical lab tests: reviewed  Tests in the radiology section of CPT®: reviewed  Tests in the medicine section of CPT®: reviewed  Discussion of test results with the performing providers: yes  Decide to obtain previous medical records or to obtain history from someone other than the patient: yes  Obtain history from someone other than the patient: yes  Review and summarize past medical records: yes  Discuss the patient with other providers: yes  Independent visualization of images, tracings, or specimens: yes

## 2023-10-16 ENCOUNTER — APPOINTMENT (OUTPATIENT)
Dept: ORTHOPEDIC SURGERY | Facility: CLINIC | Age: 68
End: 2023-10-16